# Patient Record
Sex: MALE | Race: WHITE | URBAN - METROPOLITAN AREA
[De-identification: names, ages, dates, MRNs, and addresses within clinical notes are randomized per-mention and may not be internally consistent; named-entity substitution may affect disease eponyms.]

---

## 2017-08-07 ENCOUNTER — HISTORICAL (OUTPATIENT)
Dept: LAB | Facility: HOSPITAL | Age: 67
End: 2017-08-07

## 2019-11-20 ENCOUNTER — HISTORICAL (OUTPATIENT)
Dept: ADMINISTRATIVE | Facility: HOSPITAL | Age: 69
End: 2019-11-20

## 2019-11-20 LAB
ALBUMIN SERPL-MCNC: 4.6 GM/DL (ref 3.4–5)
ALBUMIN/GLOB SERPL: 1.92 {RATIO} (ref 1.5–2.5)
ALP SERPL-CCNC: 93 UNIT/L (ref 38–126)
ALT SERPL-CCNC: 21 UNIT/L (ref 7–52)
APPEARANCE, UA: CLEAR
AST SERPL-CCNC: 23 UNIT/L (ref 15–37)
BACTERIA #/AREA URNS AUTO: NORMAL /HPF
BILIRUB SERPL-MCNC: 0.7 MG/DL (ref 0.2–1)
BILIRUB UR QL STRIP: NEGATIVE MG/DL
BILIRUBIN DIRECT+TOT PNL SERPL-MCNC: 0.2 MG/DL (ref 0–0.5)
BILIRUBIN DIRECT+TOT PNL SERPL-MCNC: 0.5 MG/DL
BUN SERPL-MCNC: 20 MG/DL (ref 7–18)
CALCIUM SERPL-MCNC: 9.3 MG/DL (ref 8.5–10)
CHLORIDE SERPL-SCNC: 105 MMOL/L (ref 98–107)
CHOLEST SERPL-MCNC: 163 MG/DL (ref 0–200)
CHOLEST/HDLC SERPL: 3.4 {RATIO}
CO2 SERPL-SCNC: 28 MMOL/L (ref 21–32)
COLOR UR: YELLOW
CREAT SERPL-MCNC: 1.18 MG/DL (ref 0.6–1.3)
GLOBULIN SER-MCNC: 2.4 GM/DL (ref 1.2–3)
GLUCOSE (UA): NEGATIVE MG/DL
GLUCOSE SERPL-MCNC: 120 MG/DL (ref 74–106)
HDLC SERPL-MCNC: 48 MG/DL (ref 35–60)
HGB UR QL STRIP: NEGATIVE UNIT/L
KETONES UR QL STRIP: NEGATIVE MG/DL
LDLC SERPL CALC-MCNC: 94 MG/DL (ref 0–129)
LEUKOCYTE ESTERASE UR QL STRIP: NEGATIVE UNIT/L
NITRITE UR QL STRIP.AUTO: NEGATIVE
PH UR STRIP: 5 [PH]
POTASSIUM SERPL-SCNC: 5 MMOL/L (ref 3.5–5.1)
PROT SERPL-MCNC: 7 GM/DL (ref 6.4–8.2)
PROT UR QL STRIP: NEGATIVE MG/DL
PSA SERPL-MCNC: 0.43 NG/ML (ref 0–4.5)
RBC #/AREA URNS HPF: NORMAL /HPF
SODIUM SERPL-SCNC: 139 MMOL/L (ref 136–145)
SP GR UR STRIP: 1.02
SQUAMOUS EPITHELIAL, UA: NORMAL /LPF
T3FREE SERPL-MCNC: 2.84 PG/ML (ref 1.45–3.48)
T4 FREE SERPL-MCNC: 0.79 NG/DL (ref 0.76–1.46)
TRIGL SERPL-MCNC: 156 MG/DL (ref 30–150)
TSH SERPL-ACNC: 0.89 MIU/ML (ref 0.35–4.94)
UROBILINOGEN UR STRIP-ACNC: 0.2 MG/DL
VLDLC SERPL CALC-MCNC: 31.2 MG/DL
WBC #/AREA URNS AUTO: NORMAL /[HPF]

## 2020-05-20 ENCOUNTER — HISTORICAL (OUTPATIENT)
Dept: ADMINISTRATIVE | Facility: HOSPITAL | Age: 70
End: 2020-05-20

## 2020-05-20 LAB
BUN SERPL-MCNC: 25 MG/DL (ref 7–18)
CALCIUM SERPL-MCNC: 9.2 MG/DL (ref 8.5–10)
CHLORIDE SERPL-SCNC: 109 MMOL/L (ref 98–107)
CO2 SERPL-SCNC: 27 MMOL/L (ref 21–32)
CREAT SERPL-MCNC: 1.19 MG/DL (ref 0.6–1.3)
CREAT/UREA NIT SERPL: 21
EST. AVERAGE GLUCOSE BLD GHB EST-MCNC: 120 MG/DL
GLUCOSE SERPL-MCNC: 108 MG/DL (ref 74–106)
HBA1C MFR BLD: 5.8 % (ref 4.4–6.4)
POTASSIUM SERPL-SCNC: 5 MMOL/L (ref 3.5–5.1)
SODIUM SERPL-SCNC: 142 MMOL/L (ref 136–145)

## 2021-12-01 PROBLEM — Z00.00 ENCOUNTER FOR PREVENTIVE HEALTH EXAMINATION: Status: ACTIVE | Noted: 2021-12-01

## 2021-12-02 ENCOUNTER — APPOINTMENT (OUTPATIENT)
Dept: NEUROSURGERY | Facility: CLINIC | Age: 71
End: 2021-12-02
Payer: MEDICARE

## 2021-12-02 DIAGNOSIS — I67.1 CEREBRAL ANEURYSM, NONRUPTURED: ICD-10-CM

## 2021-12-02 DIAGNOSIS — Q28.2 ARTERIOVENOUS MALFORMATION OF CEREBRAL VESSELS: ICD-10-CM

## 2021-12-02 PROCEDURE — 99443: CPT | Mod: 95

## 2021-12-10 PROBLEM — I67.1 CEREBROVASCULAR DURAL AV FISTULA: Status: ACTIVE | Noted: 2021-12-10

## 2021-12-10 PROBLEM — Q28.2 CEREBRAL AVM: Status: ACTIVE | Noted: 2021-12-10

## 2021-12-10 NOTE — REASON FOR VISIT
[New Patient Visit] : a new patient visit [Home] : at home, [unfilled] , at the time of the visit. [Medical Office: ([facility])___] : at the medical office located in  [Spouse] : spouse [Verbal consent obtained from patient] : the patient, [unfilled]

## 2021-12-10 NOTE — ASSESSMENT
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 The risks, benefits, alternatives, complications and personnel associated with the procedure were discussed with the patient and the family in great detail.  They request that we proceed. 1/4/2022

## 2021-12-10 NOTE — HISTORY OF PRESENT ILLNESS
[Home] : at home, [unfilled] , at the time of the visit. [Medical Office: ([facility])___] : at the medical office located in  [Verbal consent obtained from patient] : the patient, [unfilled] [de-identified] : Hany cole is a 71yr old male on the phone for a new patient visit. 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 Here to discuss treatment.

## 2022-01-03 ENCOUNTER — OUTPATIENT (OUTPATIENT)
Dept: OUTPATIENT SERVICES | Facility: HOSPITAL | Age: 72
LOS: 1 days | End: 2022-01-03
Payer: MEDICARE

## 2022-01-03 ENCOUNTER — OUTPATIENT (OUTPATIENT)
Dept: OUTPATIENT SERVICES | Facility: HOSPITAL | Age: 72
LOS: 1 days | End: 2022-01-03

## 2022-01-03 VITALS
HEIGHT: 74 IN | DIASTOLIC BLOOD PRESSURE: 77 MMHG | RESPIRATION RATE: 16 BRPM | SYSTOLIC BLOOD PRESSURE: 136 MMHG | OXYGEN SATURATION: 97 % | TEMPERATURE: 97 F | WEIGHT: 250 LBS | HEART RATE: 90 BPM

## 2022-01-03 DIAGNOSIS — E78.5 HYPERLIPIDEMIA, UNSPECIFIED: ICD-10-CM

## 2022-01-03 DIAGNOSIS — Z01.818 ENCOUNTER FOR OTHER PREPROCEDURAL EXAMINATION: ICD-10-CM

## 2022-01-03 DIAGNOSIS — I10 ESSENTIAL (PRIMARY) HYPERTENSION: ICD-10-CM

## 2022-01-03 DIAGNOSIS — I67.1 CEREBRAL ANEURYSM, NONRUPTURED: ICD-10-CM

## 2022-01-03 DIAGNOSIS — Z98.890 OTHER SPECIFIED POSTPROCEDURAL STATES: Chronic | ICD-10-CM

## 2022-01-03 DIAGNOSIS — Z87.19 PERSONAL HISTORY OF OTHER DISEASES OF THE DIGESTIVE SYSTEM: Chronic | ICD-10-CM

## 2022-01-03 DIAGNOSIS — Z11.52 ENCOUNTER FOR SCREENING FOR COVID-19: ICD-10-CM

## 2022-01-03 LAB
ALBUMIN SERPL ELPH-MCNC: 4.5 G/DL — SIGNIFICANT CHANGE UP (ref 3.3–5)
ALP SERPL-CCNC: 123 U/L — HIGH (ref 40–120)
ALT FLD-CCNC: 40 U/L — SIGNIFICANT CHANGE UP (ref 10–45)
ANION GAP SERPL CALC-SCNC: 14 MMOL/L — SIGNIFICANT CHANGE UP (ref 5–17)
AST SERPL-CCNC: 30 U/L — SIGNIFICANT CHANGE UP (ref 10–40)
BILIRUB SERPL-MCNC: 0.4 MG/DL — SIGNIFICANT CHANGE UP (ref 0.2–1.2)
BLD GP AB SCN SERPL QL: NEGATIVE — SIGNIFICANT CHANGE UP
BUN SERPL-MCNC: 26 MG/DL — HIGH (ref 7–23)
CALCIUM SERPL-MCNC: 9.7 MG/DL — SIGNIFICANT CHANGE UP (ref 8.4–10.5)
CHLORIDE SERPL-SCNC: 102 MMOL/L — SIGNIFICANT CHANGE UP (ref 96–108)
CO2 SERPL-SCNC: 23 MMOL/L — SIGNIFICANT CHANGE UP (ref 22–31)
CREAT SERPL-MCNC: 1.22 MG/DL — SIGNIFICANT CHANGE UP (ref 0.5–1.3)
GLUCOSE SERPL-MCNC: 130 MG/DL — HIGH (ref 70–99)
HCT VFR BLD CALC: 46.3 % — SIGNIFICANT CHANGE UP (ref 39–50)
HGB BLD-MCNC: 15.7 G/DL — SIGNIFICANT CHANGE UP (ref 13–17)
MCHC RBC-ENTMCNC: 31 PG — SIGNIFICANT CHANGE UP (ref 27–34)
MCHC RBC-ENTMCNC: 33.9 GM/DL — SIGNIFICANT CHANGE UP (ref 32–36)
MCV RBC AUTO: 91.3 FL — SIGNIFICANT CHANGE UP (ref 80–100)
NRBC # BLD: 0 /100 WBCS — SIGNIFICANT CHANGE UP (ref 0–0)
PLATELET # BLD AUTO: 208 K/UL — SIGNIFICANT CHANGE UP (ref 150–400)
POTASSIUM SERPL-MCNC: 4.3 MMOL/L — SIGNIFICANT CHANGE UP (ref 3.5–5.3)
POTASSIUM SERPL-SCNC: 4.3 MMOL/L — SIGNIFICANT CHANGE UP (ref 3.5–5.3)
PROT SERPL-MCNC: 7 G/DL — SIGNIFICANT CHANGE UP (ref 6–8.3)
RBC # BLD: 5.07 M/UL — SIGNIFICANT CHANGE UP (ref 4.2–5.8)
RBC # FLD: 12.8 % — SIGNIFICANT CHANGE UP (ref 10.3–14.5)
RH IG SCN BLD-IMP: POSITIVE — SIGNIFICANT CHANGE UP
SARS-COV-2 RNA SPEC QL NAA+PROBE: SIGNIFICANT CHANGE UP
SODIUM SERPL-SCNC: 139 MMOL/L — SIGNIFICANT CHANGE UP (ref 135–145)
WBC # BLD: 5.92 K/UL — SIGNIFICANT CHANGE UP (ref 3.8–10.5)
WBC # FLD AUTO: 5.92 K/UL — SIGNIFICANT CHANGE UP (ref 3.8–10.5)

## 2022-01-03 PROCEDURE — 93010 ELECTROCARDIOGRAM REPORT: CPT

## 2022-01-03 NOTE — H&P PST ADULT - NSICDXPASTMEDICALHX_GEN_ALL_CORE_FT
PAST MEDICAL HISTORY:  Cerebral AVM     H/O herpes simplex infection     HLD (hyperlipidemia)     HTN (hypertension)     Migraine headache     Seasonal allergies

## 2022-01-03 NOTE — H&P PST ADULT - FALL HARM RISK - UNIVERSAL INTERVENTIONS
Unknown if ever smoked
Bed in lowest position, wheels locked, appropriate side rails in place/Call bell, personal items and telephone in reach/Instruct patient to call for assistance before getting out of bed or chair/Non-slip footwear when patient is out of bed/[place] to call system/Physically safe environment - no spills, clutter or unnecessary equipment/Purposeful Proactive Rounding/Room/bathroom lighting operational, light cord in reach

## 2022-01-03 NOTE — H&P PST ADULT - ASSESSMENT
70 yo M with h/o HTN, HLD , cerebral AVM scheduled for cerebral angiogram and embolization on 1/4/22

## 2022-01-03 NOTE — H&P PST ADULT - PROBLEM SELECTOR PLAN 1
Cerebral angiogram and embolization  labs- CBC, BMP, T&S, EKG  Pre op instructions discussed  Cardiac clearance in file

## 2022-01-03 NOTE — H&P PST ADULT - NSICDXPASTSURGICALHX_GEN_ALL_CORE_FT
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

## 2022-01-03 NOTE — H&P PST ADULT - NEGATIVE NEUROLOGICAL SYMPTOMS
no transient paralysis/no weakness/no paresthesias/no generalized seizures/no syncope/no tremors/no vertigo/no loss of sensation/no difficulty walking

## 2022-01-03 NOTE — H&P PST ADULT - HISTORY OF PRESENT ILLNESS
72 yo M  with HTN, migraine HA- c/o left ear audible pulsation since 2015 c/o severe HA & double vision in 11//2021. Pt had Ed admission in [place]-  S/p MRI/MRA revealed  cerebral aneurysm. Pt had neurology consult- scheduled for cerebral angiogram& embolization on 1/4/2022  **pt denies any fever, chills, CP/SOB or sick contacts  **Covid 19 PCR swab done on 1/3/22 @ [facility] 72 yo M  with HTN, migraine HA- c/o left ear audible pulsation since 2015 c/o severe HA & double vision in 11/2021. Pt had Ed admission in [place]-  S/p MRI/MRA revealed  cerebral aneurysm. Pt had neurology consult- scheduled for cerebral angiogram& embolization on 1/4/2022  **pt denies any fever, chills, CP/SOB or sick contacts  **Covid 19 PCR swab done on 1/3/22 @ [facility]

## 2022-01-03 NOTE — H&P PST ADULT - NSICDXFAMILYHX_GEN_ALL_CORE_FT
FAMILY HISTORY:  Sibling  Still living? Yes, Estimated age: 71-80  FH: type 2 diabetes, Age at diagnosis: Age Unknown

## 2022-01-04 ENCOUNTER — INPATIENT (INPATIENT)
Facility: HOSPITAL | Age: 72
LOS: 3 days | Discharge: HOME | DRG: 27 | End: 2022-01-08
Attending: NEUROLOGICAL SURGERY | Admitting: NEUROLOGICAL SURGERY
Payer: MEDICARE

## 2022-01-04 ENCOUNTER — APPOINTMENT (OUTPATIENT)
Dept: NEUROSURGERY | Facility: HOSPITAL | Age: 72
End: 2022-01-04
Payer: MEDICARE

## 2022-01-04 VITALS
WEIGHT: 250 LBS | DIASTOLIC BLOOD PRESSURE: 77 MMHG | OXYGEN SATURATION: 96 % | HEART RATE: 91 BPM | TEMPERATURE: 98 F | SYSTOLIC BLOOD PRESSURE: 138 MMHG | HEIGHT: 74 IN | RESPIRATION RATE: 18 BRPM

## 2022-01-04 DIAGNOSIS — I67.1 CEREBRAL ANEURYSM, NONRUPTURED: ICD-10-CM

## 2022-01-04 DIAGNOSIS — Z98.890 OTHER SPECIFIED POSTPROCEDURAL STATES: Chronic | ICD-10-CM

## 2022-01-04 DIAGNOSIS — Z01.818 ENCOUNTER FOR OTHER PREPROCEDURAL EXAMINATION: ICD-10-CM

## 2022-01-04 DIAGNOSIS — Z87.19 PERSONAL HISTORY OF OTHER DISEASES OF THE DIGESTIVE SYSTEM: Chronic | ICD-10-CM

## 2022-01-04 LAB — APTT BLD: 40.6 SEC — HIGH (ref 27.5–35.5)

## 2022-01-04 PROCEDURE — 76377 3D RENDER W/INTRP POSTPROCES: CPT | Mod: 26

## 2022-01-04 PROCEDURE — 75894 X-RAYS TRANSCATH THERAPY: CPT | Mod: 26

## 2022-01-04 PROCEDURE — 36227 PLACE CATH XTRNL CAROTID: CPT

## 2022-01-04 PROCEDURE — 36224 PLACE CATH CAROTD ART: CPT | Mod: 50

## 2022-01-04 PROCEDURE — 36226 PLACE CATH VERTEBRAL ART: CPT | Mod: 50

## 2022-01-04 PROCEDURE — 75898 FOLLOW-UP ANGIOGRAPHY: CPT | Mod: 26

## 2022-01-04 PROCEDURE — 61624 TCAT PERM OCCLS/EMBOLJ CNS: CPT

## 2022-01-04 PROCEDURE — 75860 VEIN X-RAY NECK: CPT | Mod: 26,59

## 2022-01-04 PROCEDURE — 36012 PLACE CATHETER IN VEIN: CPT | Mod: LT

## 2022-01-04 RX ORDER — DEXAMETHASONE 0.5 MG/5ML
4 ELIXIR ORAL EVERY 6 HOURS
Refills: 0 | Status: DISCONTINUED | OUTPATIENT
Start: 2022-01-04 | End: 2022-01-07

## 2022-01-04 RX ORDER — NICARDIPINE HYDROCHLORIDE 30 MG/1
5 CAPSULE, EXTENDED RELEASE ORAL
Qty: 40 | Refills: 0 | Status: DISCONTINUED | OUTPATIENT
Start: 2022-01-04 | End: 2022-01-05

## 2022-01-04 RX ORDER — FENOFIBRATE,MICRONIZED 130 MG
145 CAPSULE ORAL DAILY
Refills: 0 | Status: DISCONTINUED | OUTPATIENT
Start: 2022-01-04 | End: 2022-01-08

## 2022-01-04 RX ORDER — TOBRAMYCIN 0.3 %
1 DROPS OPHTHALMIC (EYE) EVERY 4 HOURS
Refills: 0 | Status: COMPLETED | OUTPATIENT
Start: 2022-01-04 | End: 2022-01-05

## 2022-01-04 RX ORDER — HEPARIN SODIUM 5000 [USP'U]/ML
500 INJECTION INTRAVENOUS; SUBCUTANEOUS
Qty: 25000 | Refills: 0 | Status: DISCONTINUED | OUTPATIENT
Start: 2022-01-04 | End: 2022-01-06

## 2022-01-04 RX ORDER — HYDROCHLOROTHIAZIDE 25 MG
12.5 TABLET ORAL DAILY
Refills: 0 | Status: DISCONTINUED | OUTPATIENT
Start: 2022-01-04 | End: 2022-01-05

## 2022-01-04 RX ORDER — TOBRAMYCIN 0.3 %
1 DROPS OPHTHALMIC (EYE) EVERY 4 HOURS
Refills: 0 | Status: DISCONTINUED | OUTPATIENT
Start: 2022-01-04 | End: 2022-01-04

## 2022-01-04 RX ORDER — LEVETIRACETAM 250 MG/1
500 TABLET, FILM COATED ORAL EVERY 12 HOURS
Refills: 0 | Status: DISCONTINUED | OUTPATIENT
Start: 2022-01-04 | End: 2022-01-08

## 2022-01-04 RX ORDER — ACETAMINOPHEN 500 MG
1000 TABLET ORAL ONCE
Refills: 0 | Status: COMPLETED | OUTPATIENT
Start: 2022-01-04 | End: 2022-01-04

## 2022-01-04 RX ORDER — LISINOPRIL 2.5 MG/1
40 TABLET ORAL DAILY
Refills: 0 | Status: DISCONTINUED | OUTPATIENT
Start: 2022-01-04 | End: 2022-01-05

## 2022-01-04 RX ORDER — ONDANSETRON 8 MG/1
4 TABLET, FILM COATED ORAL EVERY 8 HOURS
Refills: 0 | Status: DISCONTINUED | OUTPATIENT
Start: 2022-01-04 | End: 2022-01-05

## 2022-01-04 RX ORDER — SODIUM CHLORIDE 9 MG/ML
1000 INJECTION INTRAMUSCULAR; INTRAVENOUS; SUBCUTANEOUS
Refills: 0 | Status: DISCONTINUED | OUTPATIENT
Start: 2022-01-04 | End: 2022-01-06

## 2022-01-04 RX ORDER — LORATADINE 10 MG/1
10 TABLET ORAL DAILY
Refills: 0 | Status: DISCONTINUED | OUTPATIENT
Start: 2022-01-04 | End: 2022-01-08

## 2022-01-04 RX ORDER — VALACYCLOVIR 500 MG/1
1 TABLET, FILM COATED ORAL
Qty: 0 | Refills: 0 | DISCHARGE

## 2022-01-04 RX ADMIN — SODIUM CHLORIDE 70 MILLILITER(S): 9 INJECTION INTRAMUSCULAR; INTRAVENOUS; SUBCUTANEOUS at 21:32

## 2022-01-04 RX ADMIN — Medication 1 DROP(S): at 21:31

## 2022-01-04 RX ADMIN — Medication 1 DROP(S): at 16:28

## 2022-01-04 RX ADMIN — Medication 1 DROP(S): at 17:24

## 2022-01-04 RX ADMIN — NICARDIPINE HYDROCHLORIDE 25 MG/HR: 30 CAPSULE, EXTENDED RELEASE ORAL at 18:47

## 2022-01-04 RX ADMIN — NICARDIPINE HYDROCHLORIDE 25 MG/HR: 30 CAPSULE, EXTENDED RELEASE ORAL at 21:05

## 2022-01-04 RX ADMIN — Medication 1000 MILLIGRAM(S): at 18:54

## 2022-01-04 RX ADMIN — Medication 400 MILLIGRAM(S): at 18:54

## 2022-01-04 RX ADMIN — LEVETIRACETAM 400 MILLIGRAM(S): 250 TABLET, FILM COATED ORAL at 17:47

## 2022-01-04 RX ADMIN — ONDANSETRON 4 MILLIGRAM(S): 8 TABLET, FILM COATED ORAL at 21:31

## 2022-01-04 RX ADMIN — Medication 4 MILLIGRAM(S): at 17:45

## 2022-01-04 RX ADMIN — SODIUM CHLORIDE 70 MILLILITER(S): 9 INJECTION INTRAMUSCULAR; INTRAVENOUS; SUBCUTANEOUS at 18:29

## 2022-01-04 RX ADMIN — HEPARIN SODIUM 5 UNIT(S)/HR: 5000 INJECTION INTRAVENOUS; SUBCUTANEOUS at 17:42

## 2022-01-04 NOTE — PROGRESS NOTE ADULT - SUBJECTIVE AND OBJECTIVE BOX
HPI:  72 yo M  with HTN, migraine HA- c/o left ear audible pulsation since 2015 c/o severe HA & double vision in 11/2021. Pt had Ed admission in [place]-  S/p MRI/MRA revealed  cerebral AVM.    Status post cerebral angiogram& embolization day #0    VITALS:  T(C): , Max: 36.7 (01-04-22 @ 10:19)  HR:  (90 - 100)  BP:  (118/78 - 138/77)  ABP:  (123/66 - 131/66)  RR:  (16 - 19)  SpO2:  (93% - 98%)  Wt(kg): --      LABS:  Na: 139 (01-03 @ 20:29)  K: 4.3 (01-03 @ 20:29)  Cl: 102 (01-03 @ 20:29)  CO2: 23 (01-03 @ 20:29)  BUN: 26 (01-03 @ 20:29)  Cr: 1.22 (01-03 @ 20:29)  Glu: 130(01-03 @ 20:29)    Hgb: 15.7 (01-03 @ 20:29)  Hct: 46.3 (01-03 @ 20:29)  WBC: 5.92 (01-03 @ 20:29)  Plt: 208 (01-03 @ 20:29)    INR:   PTT: 40.6 01-04-22 @ 16:17    IMAGING:   Recent imaging studies were reviewed.    MEDICATIONS:  dexAMETHasone  Injectable 4 milliGRAM(s) IV Push every 6 hours  fenofibrate Tablet 145 milliGRAM(s) Oral daily  heparin  Infusion 500 Unit(s)/Hr IV Continuous <Continuous>  hydrochlorothiazide 12.5 milliGRAM(s) Oral daily  levETIRAcetam  IVPB 500 milliGRAM(s) IV Intermittent every 12 hours  lisinopril 40 milliGRAM(s) Oral daily  loratadine 10 milliGRAM(s) Oral daily  niCARdipine Infusion 5 mG/Hr IV Continuous <Continuous>  sodium chloride 0.9%. 1000 milliLiter(s) IV Continuous <Continuous>  tobramycin 0.3% Ophthalmic Solution 1 Drop(s) Right EYE every 4 hours    EXAMINATION:  General:  calm  HEENT: right corneal abrasion   Neuro:  awake, alert, oriented x 3, normal EOM, Face symmetrical, follows commands, moves all extremities full power   Cards:  RRR  Respiratory:  no respiratory distress  Adomen:  soft  Extremities:  no edemaright LL splinted, groin is clear, intact pedal pulse

## 2022-01-04 NOTE — CHART NOTE - NSCHARTNOTEFT_GEN_A_CORE
Interventional Neuro- Radiology   Procedure Note      Procedure: Selective Cerebral Angiography and embolization   Pre- Procedure Diagnosis: dural AVF   Post- Procedure Diagnosis:    : Dr. Jerrell MD  Fellow: Dr. Moscoso   NP: Brandi Magana   Physician Assistant:     RN: Nan/ Brayan   Tech: Mani/ Oscar     Anesthesia: Dr. Price  (general anesthesia)    I/Os:  Fluids:  Gómez:  Contrast:  Estimated Blood Loss: <10cc    Preliminary Report:  Under general anesthesia, using a ___Fr short/long sheath to the right/ left/ bilateral groin examination of left vertebral artery/ left internal carotid artery/ left external carotid artery/ right vertebral artery/ right internal carotid artery/ right external carotid artery via selective cerebral angiography demonstrates ________. ( Official note to follow).    Patient tolerated procedure well, vital signs stable, hemodynamically stable, no change in neurological status compared to baseline. Results discussed with neurosurgery/ patient and their family. Groin sheath d/c'ed, manual compression held to hemostasis, no active bleeding, no hematoma, Vascade applied, quick clot and safeguard balloon dressing applied at _____h. Patient transferred to PACU/ NSCU for further care/ monitoring. Interventional Neuro- Radiology   Procedure Note      Procedure: Selective Cerebral Angiography and embolization   Pre- Procedure Diagnosis: dural AVF   Post- Procedure Diagnosis:    : Dr. Jerrell MD  Fellow: Dr. Moscoso   NP: Brandi Magana   Physician Assistant: Melody Grossman     RN: Nan/ Brayan   Tech: Mani/ Oscar     Anesthesia: Dr. Price  (general anesthesia)    I/Os:  Fluids:  Gómez:  Contrast:  Estimated Blood Loss: <10cc    Preliminary Report:  Under general anesthesia, using a ___Fr short/long sheath to the right/ left/ bilateral groin examination of left vertebral artery/ left internal carotid artery/ left external carotid artery/ right vertebral artery/ right internal carotid artery/ right external carotid artery via selective cerebral angiography demonstrates ________. ( Official note to follow).    Patient tolerated procedure well, vital signs stable, hemodynamically stable, no change in neurological status compared to baseline. Results discussed with neurosurgery/ patient and their family. Groin sheath d/c'ed, manual compression held to hemostasis, no active bleeding, no hematoma, Vascade applied, quick clot and safeguard balloon dressing applied at _____h. Patient transferred to PACU/ NSCU for further care/ monitoring. Interventional Neuro- Radiology   Procedure Note      Procedure: Selective Cerebral Angiography and embolization with [place] 34  Pre- Procedure Diagnosis: dural AVF   Post- Procedure Diagnosis: obliteration of dural AV fistula     : Dr Albert Allan   Fellow: Dr Adam Moscoso   NP: Brandi Magana             Physician Assistant: Melody Grossman     Nurse:                                      Gabrielle Neely RN  Radiologic Tech:                      Mani Ramirez LRT      Herbert Knott LRT  Anesthesia:                             Dr Elian Price         Right femoral artery:                4 Nigerian Fubuki 80 cm sheath   Right femoral vein:                   6 Nigerian BMX 90cm sheath     I/Os: EBL less than 10cc  Fluids:500cc  Gómez:    Contrast:    Preliminary Report:  Under general anesthesia, using a 4 Maori 80 cm Fubuki sheath to the right femoral artery and a 6 Nigerian BMX 90cm in the right femoral vein a selective cerebral angiography and embolization of fistula of was performed. Official note to follow.     Patient tolerated procedure well, vital signs stable, hemodynamically stable, no change in neurological status compared to baseline. Results discussed with neurosurgery ICU and patient. Right groin sheaths were discontinued and manual compression was held to hemostasis. Interventional Neuro- Radiology   Procedure Note      Procedure: Selective Cerebral Angiography and embolization with [place] 34  Pre- Procedure Diagnosis: dural AVF   Post- Procedure Diagnosis: obliteration of dural AV fistula     : Dr Albert Allan   Fellow: Dr Adam Moscoso   NP: Brandi Magana             Physician Assistant: Melody Grossman     Nurse:                                      Gabrielle Neely RN  Radiologic Tech:                      Mani Ramirez LRT      Herbert Knott LRT  Anesthesia:                              Dr Elian Price         Right femoral artery:                4 Samoan Fubuki 80 cm sheath   Right femoral vein:                   6 Samoan BMX 90cm sheath     I/Os: EBL less than 10cc  Fluids:500cc  Gómez:    Contrast:    Preliminary Report:  Under general anesthesia, using a 4 Wolof 80 cm Fubuki sheath to the right femoral artery and a 6 Samoan BMX 90cm in the right femoral vein a selective cerebral angiography and embolization of fistula of was performed. Official note to follow.     Patient tolerated procedure well, vital signs stable, hemodynamically stable, no change in neurological status compared to baseline. Results discussed with neurosurgery ICU and patient. Right groin sheaths were discontinued and manual compression was held to hemostasis. Interventional Neuro- Radiology   Procedure Note      Procedure: Selective Cerebral Angiography and embolization with [place] 34 and 1 pod   Pre- Procedure Diagnosis: dural AVF   Post- Procedure Diagnosis: obliteration of dural AV fistula     : Dr Albert Allan   Fellow: Dr Adam Moscoso   NP: Brandi Magana             Physician Assistant: Melody Grossman     Nurse:                                      Gabrielle Neely RN  Radiologic Tech:                      Mani Ramirez LRT      Herbert Knott LRT  Anesthesia:                              Dr Elian Price         Right femoral artery:                4 Uzbek Fubuki 80 cm sheath   Right femoral vein:                  6 Uzbek BMX 90cm sheath     I/Os: EBL less than 10cc  Fluids:500cc  Gómez:    Contrast:    Preliminary Report:  Under general anesthesia, using a 4 Citizen of [place] 80 cm Fubuki sheath to the right femoral artery and a 6 Uzbek BMX 90cm in the right femoral vein a selective cerebral angiography and embolization of fistula of was performed. Official note to follow.     Patient tolerated procedure well, vital signs stable, hemodynamically stable, no change in neurological status compared to baseline. Results discussed with neurosurgery ICU and patient. Right groin sheaths were discontinued and manual compression was held to hemostasis. Interventional Neuro- Radiology   Procedure Note      Procedure: Selective Cerebral Angiography and embolization of fistula with [place] 34 and 1 pod   Pre- Procedure Diagnosis: dural AVF   Post- Procedure Diagnosis: obliteration of dural AV fistula     : Dr Albert Allan   Fellow: Dr Adam Moscoso   NP: Brandi Magana             Physician Assistant: Melody Grossman     Nurse:                                      Gabrielle Neely RN  Radiologic Tech:                      Mani Ramirez LRT      Herbert Knott LRT  Anesthesia:                              Dr Elian Price         Right femoral artery:                4 Israeli Fubuki 80 cm sheath   Right femoral vein:                  6 Israeli BMX 90cm sheath     I/Os: EBL less than 10cc  Fluids:500cc  Gómez:    Contrast:    Preliminary Report:  Under general anesthesia, using a 4 Tajik 80 cm Fubuki sheath to the right femoral artery and a 6 Israeli BMX 90cm in the right femoral vein a selective cerebral angiography and embolization of fistula of was performed with [place] 34 and 1 pod. Official note to follow.     Patient tolerated procedure well, vital signs stable, hemodynamically stable, no change in neurological status compared to baseline. Results discussed with neurosurgery ICU and patient. Right groin sheaths were discontinued and manual compression was held to hemostasis at sites. Interventional Neuro- Radiology   Procedure Note      Procedure: Selective Cerebral Angiography and embolization of fistula with [place] 34 and 1 pod   Pre- Procedure Diagnosis: dural AVF   Post- Procedure Diagnosis: obliteration of dural AV fistula     : Dr Albert Allan   Fellow: Dr Adam Moscoso   NP: Brandi Magana             Physician Assistant: Melody Grossman PA-C    Nurse:                                      Gabrielle Neely RN  Radiologic Tech:                      Mani Ramirez LRT      Herbert Knott LRT  Anesthesia:                              Dr Elian Price         Right femoral artery:                4 Belizean Fubuki 80 cm sheath   Right femoral vein:                  6 Belizean BMX 90cm sheath     I/Os: EBL less than 10cc  Fluids:500cc  Gómez:    Contrast:    Preliminary Report:  Under general anesthesia, using a 4 Danish 80 cm Fubuki sheath to the right femoral artery and a 6 Belizean BMX 90cm in the right femoral vein a selective cerebral angiography and embolization of fistula of was performed with [place] 34 and 1 pod. Official note to follow.     Patient tolerated procedure well, vital signs stable, hemodynamically stable, no change in neurological status compared to baseline. Results discussed with neurosurgery ICU and patient. Right groin sheaths were discontinued and manual compression was held to hemostasis at sites. Interventional Neuro- Radiology   Procedure Note      Procedure: Selective Cerebral Angiography and embolization of fistula with [place] 34 and 1 pod   Pre- Procedure Diagnosis: dural AVF   Post- Procedure Diagnosis: obliteration of dural AV fistula     : Dr Albert Allan   Fellow: Dr Adam Moscoso   NP: Brandi Magana                   Physician Assistant: Melody Grossman PA-C    Nurse:                                      Gabrielle Neely RN  Radiologic Tech:                      Mani Ramirez LRT      Herbert Knott LRT  Anesthesia:                              Dr Elian Price         Right femoral artery:                4 Turkish Fubuki 80 cm sheath   Right femoral vein:                   6 Turkish BMX 90cm sheath     I/Os: EBL less than 10cc  Fluids:500cc  Gómez:    Contrast:    Preliminary Report:  Under general anesthesia, using a 4 Marshallese 80 cm Fubuki sheath to the right femoral artery and a 6 Turkish BMX 90cm in the right femoral vein a selective cerebral angiography and embolization of fistula of was performed with [place] 34 and 1 pod. Official note to follow.     Patient tolerated procedure well, vital signs stable, hemodynamically stable, no change in neurological status compared to baseline. Results discussed with neurosurgery ICU and patient. Right groin sheaths were discontinued and manual compression was held to hemostasis at sites. Interventional Neuro- Radiology   Procedure Note      Procedure: Selective Cerebral Angiography and embolization of left transverse sinus fistula with [place] 34 and 1 pod   Pre- Procedure Diagnosis:   left transverse sinus fistula    Post- Procedure Diagnosis: obliteration of left transverse sinus fistula     : Dr Albert Allan   Fellow: Dr Adam Moscoso   NP: Brandi Magana               Physician Assistant: Melody Grossman PA-C    Nurse:                                      Gabrielle Neely RN  Radiologic Tech:                      Mani Ramirez LRT      Herbert Knott LRT  Anesthesia:                              Dr Elian Price         Right femoral artery:                4 Panamanian Fubuki 80 cm sheath   Right femoral vein:                   6 Panamanian BMX 90cm sheath     I/Os: EBL less than 10cc  Fluids:500cc  Gómez:     Contrast:    [place] 34 16cc s     Preliminary Report:  Under general anesthesia, using a 4 Guyanese 80 cm Fubuki sheath to the right femoral artery and a 6 Panamanian BMX 90cm in the right femoral vein a selective cerebral angiography and embolization of fistula of was performed with [place] 34 and 1 pod. Official note to follow.     Patient tolerated procedure well, vital signs stable, hemodynamically stable, no change in neurological status compared to baseline. Results discussed with neurosurgery ICU and patient. Right groin sheaths were discontinued and manual compression was held to hemostasis at sites. Interventional Neuro- Radiology   Procedure Note      Procedure: Selective Cerebral Angiography and embolization of left transverse sinus fistula with [place] 34 and 1 pod   Pre- Procedure Diagnosis:   left transverse sinus fistula    Post- Procedure Diagnosis: obliteration of left transverse sinus fistula     : Dr Albert Allan   Fellow: Dr Adam Moscoso   NP: Brandi Magana               Physician Assistant: Melody Grossman PA-C    Nurse:                                      Gabrielle Neely RN  Radiologic Tech:                      Mani Ramirez LRT      Herbert Knott LRT  Anesthesia:                              Dr Elian Price         Right femoral artery:                4 Kittitian Fubuki 80 cm sheath   Right femoral vein:                   6 Kittitian BMX 90cm sheath     I/Os: EBL less than 10cc  Fluids:500cc  Gómez:     Contrast: 196cc    [place] 34 16cc s     Preliminary Report:  Under general anesthesia, using a 4 Libyan 80 cm Fubuki sheath to the right femoral artery and a 6 Kittitian BMX 90cm in the right femoral vein a selective cerebral angiography and embolization of fistula of was performed with [place] 34 and 1 pod. Official note to follow.     Patient tolerated procedure well, vital signs stable, hemodynamically stable, no change in neurological status compared to baseline. Results discussed with neurosurgery ICU and patient. Right groin sheaths were discontinued and manual compression was held to hemostasis at sites, by resident. Interventional Neuro- Radiology   Procedure Note      Procedure: Selective Cerebral Angiography and embolization of left transverse sinus fistula with [place] 34 and 1 pod   Pre- Procedure Diagnosis:   left transverse sinus fistula    Post- Procedure Diagnosis: obliteration of left transverse sinus fistula     : Dr Albert Allan   Fellow: Dr Adam Moscoso   NP: rBandi Magana               Physician Assistant: Melody Grossman PA-C    Nurse:                                      Gabrielle Neely RN  Radiologic Tech:                      Mnai Ramirez LRT      Herbert Knott LRT  Anesthesia:                              Dr Elian Price         Right femoral artery:                4 Romansh Fubuki 80 cm sheath   Right femoral vein:                   6 Romansh BMX 90cm sheath     I/Os: EBL less than 10cc  Fluids:500cc  Gómez: 1000    Contrast: 196cc    [place] 34 16cc s     Preliminary Report:  Under general anesthesia, using a 4 Mongolian 80 cm Fubuki sheath to the right femoral artery and a 6 Romansh BMX 90cm in the right femoral vein a selective cerebral angiography via left right internal carotid artery, right external carotid artery, right vertebral artery, left internal carotid artery, left external carotid artery left vertebral artery was done and 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  Proceeded with embolization of left transverse sinus dural av fistula with 16cc [place] 34 and 1 coil pod. Official note to follow.     Patient tolerated procedure well, vital signs stable, hemodynamically stable, no change in neurological status compared to baseline. Results discussed with neurosurgery ICU and patient. Right groin sheaths were discontinued and manual compression was held to hemostasis at sites, by resident. Interventional Neuro- Radiology   Procedure Note      Procedure: Selective Cerebral Angiography and embolization of left transverse sinus fistula with [place] 34 and 1 pod   Pre- Procedure Diagnosis:   left transverse sinus fistula    Post- Procedure Diagnosis: obliteration of left transverse sinus fistula     : Dr Albert Allan   Fellow: Dr Adam Moscoso   NP: Brandi Magana               Physician Assistant: Melody Grossman PA-C    Nurse:                                      Gabrielle Neely RN  Radiologic Tech:                      Mani Ramirez LRT      Herbert Knott LRT  Anesthesia:                              Dr Elian Price         Right femoral artery:                4 Thai Fubuki 80 cm sheath   Right femoral vein:                   6 Thai BMX 90cm sheath     I/Os: EBL less than 10cc  Fluids:500cc  Gómez: 1000    Contrast: 196cc    [place] 34 16cc s     Meds: ancef 2 grams; decadron 10mg IV; heparin 4000 units IV    Preliminary Report:  Under general anesthesia, using a 4 Burkinan 80 cm Fubuki sheath to the right femoral artery and a 6 Thai BMX 90cm in the right femoral vein a selective cerebral angiography via left right internal carotid artery, right external carotid artery, right vertebral artery, left internal carotid artery, left external carotid artery left vertebral artery was done and 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  Proceeded with embolization of left transverse sinus dural av fistula with 16cc [place] 34 and 1 coil pod. Official note to follow.   -110 post procedure  Heparin drip goal ptt 50-70 post procedure then after 24hrs will evaluate stability to transition to eliquis 5mg bid  Patient tolerated procedure well, vital signs stable, hemodynamically stable, no change in neurological status compared to baseline. Results discussed with neurosurgery ICU and patient. Right groin sheaths were discontinued, vascade device deployed, and manual compression was held to hemostasis at sites, by resident safeguard placed at 1515.

## 2022-01-04 NOTE — CHART NOTE - NSCHARTNOTESELECT_GEN_ALL_CORE
VTE Risk Assessment/Event Note
Interventional Neuro Radiology/Event Note
Interventional Neuro Radiology/Event Note

## 2022-01-04 NOTE — PROGRESS NOTE ADULT - ASSESSMENT
Summary:   72 yo M  with HTN, migraine HA- c/o left ear audible pulsation since 2015 c/o severe HA & double vision in 11/2021. Pt had Ed admission in [place]-  S/p MRI/MRA revealed  cerebral AVM.    Status post cerebral angiogram& embolization day #0    NEURO:    q1h neuro checks  Decadron 4mg Q6  Keppra 500 BID  Heparin gtt PTT goal 50-70    CARDS:    -140  Fenofibrate 145 mg   Lisinopril 40  Hydrochlorthiazide 12.5  Cardene gtt     PULM:    Nasal canula  sat > 92%    RENAL:    BMP QD  NS 70 ml/hr   Hydrochlorthiazide 12.5    GASTRO:    advance as tolerated  Monitor bowel motion     HEME:  monitor H/H    DVT prophylaxis: SCDs,   hold anticoagulation, fresh post op     ENDO:    140-180 mg/dl    ID:    Monitor for fever     Code status:  Full code  Disposition:  ICU    This patient was at high risk of neurologic deterioration and/or death due to: intracranial bleeding, brain edema

## 2022-01-04 NOTE — CHART NOTE - NSCHARTNOTEFT_GEN_A_CORE
Interventional Neuro Radiology  Pre-Procedure Note    This is a 72 yo M with HTN, migraine HA- c/o left ear audible pulsation since 2015 c/o severe HA & double vision in 11/2021. Pt had Ed admission in [place]-  S/p MRI/MRA revealed AVF. Patient presets now to neuro IR for selective cerebral angiography and possible embolization.     Neuro Exam: Awake and alert, oriented x3, fluent, normal naming and repetition, follows 3 step commands. Extraocular movements intact, no nystagmus, visual fields full, face symmetric, tongue midline. No drift, 5/5 power x 4 extremities. Normal sensation to LT. Normal finger-to-nose and rapid alternating movements.    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)  H/O cholecystitis h/o cholecystectomy      Social History:   Denies tobacco use    FAMILY HISTORY:  FH: type 2 diabetes (Sibling)      Allergies:   No Known Allergies      Current Medications:   · 	fenofibrate 145 mg oral tablet: Last Dose Taken:  , 1 tab(s) orally once a day  · 	hydroCHLOROthiazide 12.5 mg oral tablet: Last Dose Taken:  , 1 tab(s) orally once a day  · 	lisinopril 40 mg oral tablet: Last Dose Taken:  , 1 tab(s) orally once a day  · 	Multiple Vitamins oral tablet: 1 tab(s) orally once a day  · 	ZyrTEC 10 mg oral tablet: 1  orally , As Needed  · 	Allegra-D 12 Hour: Last Dose Taken:  , 1  orally , As Needed  · 	Claritin-D 12 Hour: Last Dose Taken:  , 1  orally , As Needed  · 	valACYclovir 1 g oral tablet: 1  orally , As Needed  · 	hydrocodone- acetamenophen: Last Dose Taken:  , 1  orally , As Needed        Labs:                         15.7   5.92  )-----------( 208      ( 03 Jan 2022 20:29 )             46.3       01-03    139  |  102  |  26<H>  ----------------------------<  130<H>  4.3   |  23  |  1.22    Ca    9.7      03 Jan 2022 20:29    TPro  7.0  /  Alb  4.5  /  TBili  0.4  /  DBili  x   /  AST  30  /  ALT  40  /  AlkPhos  123<H>  01-03      Blood Bank: 01-03-22  O  --  Positive    Assessment/Plan:   This is a 72yo male  presents with cerebral AVF. Patient presents to neuro-IR for selective cerebral angiography and embolization. Procedure/ risks/ benefits/ goals/ alternatives were explained. Risks include but are not limited to stroke/ vessel injury/ hemorrhage/ groin hematoma. All questions answered. Informed content obtained from patient. Consent placed in chart.     Leslie Keith PA-C  [phone]

## 2022-01-04 NOTE — ASU PATIENT PROFILE, ADULT - FALL HARM RISK - UNIVERSAL INTERVENTIONS
Bed in lowest position, wheels locked, appropriate side rails in place/Call bell, personal items and telephone in reach/Instruct patient to call for assistance before getting out of bed or chair/Non-slip footwear when patient is out of bed/[place] to call system/Physically safe environment - no spills, clutter or unnecessary equipment/Purposeful Proactive Rounding/Room/bathroom lighting operational, light cord in reach

## 2022-01-04 NOTE — CHART NOTE - NSCHARTNOTEFT_GEN_A_CORE
CAPRINI SCORE [CLOT] Score on Admission for     AGE RELATED RISK FACTORS                                                       MOBILITY RELATED FACTORS  [ ] Age 41-60 years                                            (1 Point)                  [ ] Bed rest                                                        (1 Point)  [x] Age: 61-74 years                                           (2 Points)                 [ ] Plaster cast                                                   (2 Points)  [ ] Age= 75 years                                              (3 Points)                 [ ] Bed bound for more than 72 hours                 (2 Points)    DISEASE RELATED RISK FACTORS                                               GENDER SPECIFIC FACTORS  [ ] Edema in the lower extremities                       (1 Point)                  [ ] Pregnancy                                                     (1 Point)  [ ] Varicose veins                                               (1 Point)                  [ ] Post-partum < 6 weeks                                   (1 Point)             [ ] BMI > 25 Kg/m2                                            (1 Point)                  [ ] Hormonal therapy  or oral contraception          (1 Point)                 [ ] Sepsis (in the previous month)                        (1 Point)                  [ ] History of pregnancy complications                 (1 point)  [ ] Pneumonia or serious lung disease                                               [ ] Unexplained or recurrent                     (1 Point)           (in the previous month)                               (1 Point)  [ ] Abnormal pulmonary function test                     (1 Point)                 SURGERY RELATED RISK FACTORS (include planned surgeries)  [ ] Acute myocardial infarction                              (1 Point)                 [ ]  Section                                             (1 Point)  [ ] Congestive heart failure (in the previous month)  (1 Point)         [ ] Minor surgery                                                  (1 Point)   [ ] Inflammatory bowel disease                             (1 Point)                 [ ] Arthroscopic surgery                                        (2 Points)  [ ] Central venous access                                      (2 Points)                [ ] General surgery lasting more than 45 minutes   (2 Points)       [ ] Stroke (in the previous month)                          (5 Points)               [ ] Elective arthroplasty                                         (5 Points)            [ ] current or past malignancy                              (2 Points)                                                                                                       HEMATOLOGY RELATED FACTORS                                                 TRAUMA RELATED RISK FACTORS  [ ] Prior episodes of VTE                                     (3 Points)                [ ] Fracture of the hip, pelvis, or leg                       (5 Points)  [ ] Positive family history for VTE                         (3 Points)                 [ ] Acute spinal cord injury (in the previous month)  (5 Points)  [ ] Prothrombin 29311 A                                     (3 Points)                 [ ] Paralysis  (less than 1 month)                             (5 Points)  [ ] Factor V Leiden                                             (3 Points)                  [ ] Multiple Trauma within 1 month                        (5 Points)  [ ] Lupus anticoagulants                                     (3 Points)                                                           [ ] Anticardiolipin antibodies                               (3 Points)                                                       [ ] High homocysteine in the blood                      (3 Points)                                             [ ] Other congenital or acquired thrombophilia      (3 Points)                                                [ ] Heparin induced thrombocytopenia                  (3 Points)                                          Total Score [     2    ]    Risk:  Very low 0   Low 1 to 2   Moderate 3 to 4   High =5       VTE Prophylasix Recommednations:  [x] mechanical pneumatic compression devices                                      [ ] contraindicated: _____________________  [ ] chemo prophylasix                                                                                   [] contraindicated _____________________    **** HIGH LIKELIHOOD DVT PRESENT ON ADMISSION  [ ] (please order LE dopplers within 24 hours of admission)

## 2022-01-05 LAB
ANION GAP SERPL CALC-SCNC: 16 MMOL/L — SIGNIFICANT CHANGE UP (ref 5–17)
APTT BLD: 29.1 SEC — SIGNIFICANT CHANGE UP (ref 27.5–35.5)
APTT BLD: 39 SEC — HIGH (ref 27.5–35.5)
APTT BLD: 39.2 SEC — HIGH (ref 27.5–35.5)
APTT BLD: 44.8 SEC — HIGH (ref 27.5–35.5)
BUN SERPL-MCNC: 24 MG/DL — HIGH (ref 7–23)
CALCIUM SERPL-MCNC: 8.1 MG/DL — LOW (ref 8.4–10.5)
CHLORIDE SERPL-SCNC: 106 MMOL/L — SIGNIFICANT CHANGE UP (ref 96–108)
CO2 SERPL-SCNC: 19 MMOL/L — LOW (ref 22–31)
CREAT SERPL-MCNC: 1.08 MG/DL — SIGNIFICANT CHANGE UP (ref 0.5–1.3)
GLUCOSE SERPL-MCNC: 187 MG/DL — HIGH (ref 70–99)
HCT VFR BLD CALC: 37.6 % — LOW (ref 39–50)
HCT VFR BLD CALC: 41.5 % — SIGNIFICANT CHANGE UP (ref 39–50)
HGB BLD-MCNC: 12.9 G/DL — LOW (ref 13–17)
HGB BLD-MCNC: 14 G/DL — SIGNIFICANT CHANGE UP (ref 13–17)
MAGNESIUM SERPL-MCNC: 1.8 MG/DL — SIGNIFICANT CHANGE UP (ref 1.6–2.6)
MCHC RBC-ENTMCNC: 30.4 PG — SIGNIFICANT CHANGE UP (ref 27–34)
MCHC RBC-ENTMCNC: 31 PG — SIGNIFICANT CHANGE UP (ref 27–34)
MCHC RBC-ENTMCNC: 33.7 GM/DL — SIGNIFICANT CHANGE UP (ref 32–36)
MCHC RBC-ENTMCNC: 34.3 GM/DL — SIGNIFICANT CHANGE UP (ref 32–36)
MCV RBC AUTO: 90 FL — SIGNIFICANT CHANGE UP (ref 80–100)
MCV RBC AUTO: 90.4 FL — SIGNIFICANT CHANGE UP (ref 80–100)
NRBC # BLD: 0 /100 WBCS — SIGNIFICANT CHANGE UP (ref 0–0)
NRBC # BLD: 0 /100 WBCS — SIGNIFICANT CHANGE UP (ref 0–0)
PHOSPHATE SERPL-MCNC: 2.2 MG/DL — LOW (ref 2.5–4.5)
PLATELET # BLD AUTO: 166 K/UL — SIGNIFICANT CHANGE UP (ref 150–400)
PLATELET # BLD AUTO: 190 K/UL — SIGNIFICANT CHANGE UP (ref 150–400)
POTASSIUM SERPL-MCNC: 3.8 MMOL/L — SIGNIFICANT CHANGE UP (ref 3.5–5.3)
POTASSIUM SERPL-SCNC: 3.8 MMOL/L — SIGNIFICANT CHANGE UP (ref 3.5–5.3)
RBC # BLD: 4.16 M/UL — LOW (ref 4.2–5.8)
RBC # BLD: 4.61 M/UL — SIGNIFICANT CHANGE UP (ref 4.2–5.8)
RBC # FLD: 13.2 % — SIGNIFICANT CHANGE UP (ref 10.3–14.5)
RBC # FLD: 13.3 % — SIGNIFICANT CHANGE UP (ref 10.3–14.5)
SODIUM SERPL-SCNC: 141 MMOL/L — SIGNIFICANT CHANGE UP (ref 135–145)
WBC # BLD: 10.63 K/UL — HIGH (ref 3.8–10.5)
WBC # BLD: 8.35 K/UL — SIGNIFICANT CHANGE UP (ref 3.8–10.5)
WBC # FLD AUTO: 10.63 K/UL — HIGH (ref 3.8–10.5)
WBC # FLD AUTO: 8.35 K/UL — SIGNIFICANT CHANGE UP (ref 3.8–10.5)

## 2022-01-05 PROCEDURE — 99233 SBSQ HOSP IP/OBS HIGH 50: CPT

## 2022-01-05 RX ORDER — CALCIUM GLUCONATE 100 MG/ML
1 VIAL (ML) INTRAVENOUS ONCE
Refills: 0 | Status: COMPLETED | OUTPATIENT
Start: 2022-01-05 | End: 2022-01-05

## 2022-01-05 RX ORDER — POTASSIUM PHOSPHATE, MONOBASIC POTASSIUM PHOSPHATE, DIBASIC 236; 224 MG/ML; MG/ML
15 INJECTION, SOLUTION INTRAVENOUS ONCE
Refills: 0 | Status: COMPLETED | OUTPATIENT
Start: 2022-01-05 | End: 2022-01-05

## 2022-01-05 RX ORDER — LISINOPRIL 2.5 MG/1
40 TABLET ORAL DAILY
Refills: 0 | Status: DISCONTINUED | OUTPATIENT
Start: 2022-01-05 | End: 2022-01-08

## 2022-01-05 RX ORDER — HYDROCHLOROTHIAZIDE 25 MG
12.5 TABLET ORAL DAILY
Refills: 0 | Status: DISCONTINUED | OUTPATIENT
Start: 2022-01-05 | End: 2022-01-08

## 2022-01-05 RX ORDER — POTASSIUM CHLORIDE 20 MEQ
40 PACKET (EA) ORAL ONCE
Refills: 0 | Status: COMPLETED | OUTPATIENT
Start: 2022-01-05 | End: 2022-01-05

## 2022-01-05 RX ADMIN — Medication 1 DROP(S): at 02:30

## 2022-01-05 RX ADMIN — Medication 4 MILLIGRAM(S): at 05:18

## 2022-01-05 RX ADMIN — HEPARIN SODIUM 14 UNIT(S)/HR: 5000 INJECTION INTRAVENOUS; SUBCUTANEOUS at 23:19

## 2022-01-05 RX ADMIN — LISINOPRIL 40 MILLIGRAM(S): 2.5 TABLET ORAL at 05:29

## 2022-01-05 RX ADMIN — Medication 12.5 MILLIGRAM(S): at 14:22

## 2022-01-05 RX ADMIN — HEPARIN SODIUM 13 UNIT(S)/HR: 5000 INJECTION INTRAVENOUS; SUBCUTANEOUS at 14:55

## 2022-01-05 RX ADMIN — Medication 4 MILLIGRAM(S): at 13:44

## 2022-01-05 RX ADMIN — Medication 4 MILLIGRAM(S): at 00:00

## 2022-01-05 RX ADMIN — SODIUM CHLORIDE 70 MILLILITER(S): 9 INJECTION INTRAMUSCULAR; INTRAVENOUS; SUBCUTANEOUS at 07:20

## 2022-01-05 RX ADMIN — HEPARIN SODIUM 11 UNIT(S)/HR: 5000 INJECTION INTRAVENOUS; SUBCUTANEOUS at 07:01

## 2022-01-05 RX ADMIN — Medication 100 GRAM(S): at 02:37

## 2022-01-05 RX ADMIN — Medication 4 MILLIGRAM(S): at 23:19

## 2022-01-05 RX ADMIN — LORATADINE 10 MILLIGRAM(S): 10 TABLET ORAL at 13:42

## 2022-01-05 RX ADMIN — LEVETIRACETAM 400 MILLIGRAM(S): 250 TABLET, FILM COATED ORAL at 17:34

## 2022-01-05 RX ADMIN — Medication 40 MILLIEQUIVALENT(S): at 02:37

## 2022-01-05 RX ADMIN — Medication 4 MILLIGRAM(S): at 20:00

## 2022-01-05 RX ADMIN — POTASSIUM PHOSPHATE, MONOBASIC POTASSIUM PHOSPHATE, DIBASIC 62.5 MILLIMOLE(S): 236; 224 INJECTION, SOLUTION INTRAVENOUS at 03:36

## 2022-01-05 RX ADMIN — LEVETIRACETAM 400 MILLIGRAM(S): 250 TABLET, FILM COATED ORAL at 05:18

## 2022-01-05 RX ADMIN — Medication 145 MILLIGRAM(S): at 13:44

## 2022-01-05 RX ADMIN — HEPARIN SODIUM 9 UNIT(S)/HR: 5000 INJECTION INTRAVENOUS; SUBCUTANEOUS at 00:16

## 2022-01-05 RX ADMIN — Medication 1 DROP(S): at 09:55

## 2022-01-05 RX ADMIN — Medication 1 DROP(S): at 05:17

## 2022-01-05 NOTE — PROGRESS NOTE ADULT - TIME BILLING
left transverse sinus fistula   post obliteration of left transverse sinus fistula   heparin drip and decadron per NS  patient refused MRI brain   PT/OT

## 2022-01-05 NOTE — PROGRESS NOTE ADULT - SUBJECTIVE AND OBJECTIVE BOX
HPI:  72 yo M  with HTN, migraine HA- c/o left ear audible pulsation since 2015 c/o severe HA & double vision in 11/2021. Pt had Ed admission in [place]-  S/p MRI/MRA revealed  cerebral AVM.    Status post cerebral angiogram& embolization day #1    OVERNIGHT EVENTS:   No acute events overnight.    VITALS:  T(C): , Max: 37.1 (01-05-22 @ 04:00)  HR:  (91 - 109)  BP:  (97/53 - 138/77)  ABP:  (88/56 - 131/66)  RR:  (14 - 19)  SpO2:  (91% - 99%)  Wt(kg): --      01-04-22 @ 07:01  -  01-05-22 @ 07:00  --------------------------------------------------------  IN: 2001 mL / OUT: 1525 mL / NET: 476 mL    01-05-22 @ 07:01  -  01-05-22 @ 07:44  --------------------------------------------------------  IN: 201 mL / OUT: 40 mL / NET: 161 mL      LABS:  Na: 141 (01-04 @ 23:43), 139 (01-03 @ 20:29)  K: 3.8 (01-04 @ 23:43), 4.3 (01-03 @ 20:29)  Cl: 106 (01-04 @ 23:43), 102 (01-03 @ 20:29)  CO2: 19 (01-04 @ 23:43), 23 (01-03 @ 20:29)  BUN: 24 (01-04 @ 23:43), 26 (01-03 @ 20:29)  Cr: 1.08 (01-04 @ 23:43), 1.22 (01-03 @ 20:29)  Glu: 187(01-04 @ 23:43), 130(01-03 @ 20:29)    Hgb: 12.9 (01-05 @ 06:15), 14.0 (01-04 @ 23:43), 15.7 (01-03 @ 20:29)  Hct: 37.6 (01-05 @ 06:15), 41.5 (01-04 @ 23:43), 46.3 (01-03 @ 20:29)  WBC: 10.63 (01-05 @ 06:15), 8.35 (01-04 @ 23:43), 5.92 (01-03 @ 20:29)  Plt: 166 (01-05 @ 06:15), 190 (01-04 @ 23:43), 208 (01-03 @ 20:29)    INR:   PTT: 39.2 01-05-22 @ 06:15, 29.1 01-04-22 @ 23:43, 40.6 01-04-22 @ 16:17    IMAGING:   Recent imaging studies were reviewed.    MEDICATIONS:  dexAMETHasone  Injectable 4 milliGRAM(s) IV Push every 6 hours  fenofibrate Tablet 145 milliGRAM(s) Oral daily  heparin  Infusion 500 Unit(s)/Hr IV Continuous <Continuous>  hydrochlorothiazide 12.5 milliGRAM(s) Oral daily  levETIRAcetam  IVPB 500 milliGRAM(s) IV Intermittent every 12 hours  lisinopril 40 milliGRAM(s) Oral daily  loratadine 10 milliGRAM(s) Oral daily  niCARdipine Infusion 5 mG/Hr IV Continuous <Continuous>  ondansetron Injectable 4 milliGRAM(s) IV Push every 8 hours PRN  sodium chloride 0.9%. 1000 milliLiter(s) IV Continuous <Continuous>  tobramycin 0.3% Ophthalmic Solution 1 Drop(s) Right EYE every 4 hours    EXAMINATION:  General:  calm  HEENT: right corneal abrasion   Neuro:  awake, alert, oriented x 3, normal EOM, Face symmetrical, follows commands, moves all extremities full power   Cards:  RRR  Respiratory:  no respiratory distress  Adomen:  soft  Extremities:  no edemaright LL splinted, groin is clear, intact pedal pulse HPI:  70 yo M  with HTN, migraine HA- c/o left ear audible pulsation since 2015 c/o severe HA & double vision in 11/2021. Pt had Ed admission in [place]-  S/p MRI/MRA revealed  cerebral AVM.    Status post cerebral angiogram& embolization day #1    PAST MEDICAL & SURGICAL HISTORY:  Cerebral AVM    HTN (hypertension)    HLD (hyperlipidemia)    Migraine headache    H/O herpes simplex infection    Seasonal allergies    S/P foot surgery, left  amputation left ist, 4th &amp; 5th toes- s/p injury ( 1972)    H/O cholecystitis  h/o cholecystectomy        Allergies    No Known Allergies    Intolerances            REVIEW OF SYSTEMS: [ ] Unable to Assess due to neurologic exam   [ x] All ROS addressed below are non-contributory, except:  Neuro: [ ] Headache [ ] Back pain [ ] Numbness [ ] Weakness [ ] Ataxia [ ] Dizziness [ ] Aphasia [ ] Dysarthria [ ] Visual disturbance  Resp: [ ] Shortness of breath/dyspnea, [ ] Orthopnea [ ] Cough  CV: [ ] Chest pain [ ] Palpitation [ ] Lightheadedness [ ] Syncope  Renal: [ ] Thirst [ ] Edema  GI: [ ] Nausea [ ] Emesis [ ] Abdominal pain [ ] Constipation [ ] Diarrhea  Hem: [ ] Hematemesis [ ] bright red blood per rectum  ID: [ ] Fever [ ] Chills [ ] Dysuria  ENT: [ ] Rhinorrhea        OVERNIGHT EVENTS:   on heparin drip overnight     VITALS:  T(C): , Max: 37.1 (01-05-22 @ 04:00)  HR:  (91 - 109)  BP:  (97/53 - 138/77)  ABP:  (88/56 - 131/66)  RR:  (14 - 19)  SpO2:  (91% - 99%)  Wt(kg): --      01-04-22 @ 07:01  -  01-05-22 @ 07:00  --------------------------------------------------------  IN: 2001 mL / OUT: 1525 mL / NET: 476 mL    01-05-22 @ 07:01  -  01-05-22 @ 07:44  --------------------------------------------------------  IN: 201 mL / OUT: 40 mL / NET: 161 mL      LABS:  Na: 141 (01-04 @ 23:43), 139 (01-03 @ 20:29)  K: 3.8 (01-04 @ 23:43), 4.3 (01-03 @ 20:29)  Cl: 106 (01-04 @ 23:43), 102 (01-03 @ 20:29)  CO2: 19 (01-04 @ 23:43), 23 (01-03 @ 20:29)  BUN: 24 (01-04 @ 23:43), 26 (01-03 @ 20:29)  Cr: 1.08 (01-04 @ 23:43), 1.22 (01-03 @ 20:29)  Glu: 187(01-04 @ 23:43), 130(01-03 @ 20:29)    Hgb: 12.9 (01-05 @ 06:15), 14.0 (01-04 @ 23:43), 15.7 (01-03 @ 20:29)  Hct: 37.6 (01-05 @ 06:15), 41.5 (01-04 @ 23:43), 46.3 (01-03 @ 20:29)  WBC: 10.63 (01-05 @ 06:15), 8.35 (01-04 @ 23:43), 5.92 (01-03 @ 20:29)  Plt: 166 (01-05 @ 06:15), 190 (01-04 @ 23:43), 208 (01-03 @ 20:29)    INR:   PTT: 39.2 01-05-22 @ 06:15, 29.1 01-04-22 @ 23:43, 40.6 01-04-22 @ 16:17    IMAGING:   Recent imaging studies were reviewed.    MEDICATIONS:  dexAMETHasone  Injectable 4 milliGRAM(s) IV Push every 6 hours  fenofibrate Tablet 145 milliGRAM(s) Oral daily  heparin  Infusion 500 Unit(s)/Hr IV Continuous <Continuous>  hydrochlorothiazide 12.5 milliGRAM(s) Oral daily  levETIRAcetam  IVPB 500 milliGRAM(s) IV Intermittent every 12 hours  lisinopril 40 milliGRAM(s) Oral daily  loratadine 10 milliGRAM(s) Oral daily  niCARdipine Infusion 5 mG/Hr IV Continuous <Continuous>  ondansetron Injectable 4 milliGRAM(s) IV Push every 8 hours PRN  sodium chloride 0.9%. 1000 milliLiter(s) IV Continuous <Continuous>  tobramycin 0.3% Ophthalmic Solution 1 Drop(s) Right EYE every 4 hours    EXAMINATION:  General:  calm  HEENT: right corneal abrasion   Neuro:  awake, alert, oriented x 3, normal EOM, Face symmetrical, follows commands, moves all extremities full power   Cards:  RRR  Respiratory:  no respiratory distress  Adomen:  soft  Extremities:  no edema  no groin hematoma,  intact pedal pulse

## 2022-01-05 NOTE — PROGRESS NOTE ADULT - ASSESSMENT
Summary:   72 yo M  with HTN, migraine HA- c/o left ear audible pulsation since 2015 c/o severe HA & double vision in 11/2021. Pt had Ed admission in [place]-  S/p MRI/MRA revealed  cerebral AVM.    Status post cerebral angiogram& embolization day #1    NEURO:    q1h neuro checks  Decadron 4mg Q6  Keppra 500 BID  Heparin gtt PTT goal 50-70    CARDS:    -140  Fenofibrate 145 mg   Lisinopril 40  Hydrochlorthiazide 12.5  Cardene gtt     PULM:    Nasal canula  sat > 92%    RENAL:    BMP QD  NS 70 ml/hr   Hydrochlorthiazide 12.5    GASTRO:    advance as tolerated  Monitor bowel motion     HEME:  monitor H/H    DVT prophylaxis: SCDs,   hold anticoagulation, fresh post op     ENDO:    140-180 mg/dl    ID:    Monitor for fever     Code status:  Full code  Disposition:  ICU    This patient was at high risk of neurologic deterioration and/or death due to: intracranial bleeding, brain edema      Summary:   70 yo M  with HTN, migraine HA- c/o left ear audible pulsation since 2015 c/o severe HA & double vision in 11/2021. Pt had Ed admission in [place]-  S/p MRI/MRA revealed  left transverse sinus fistula    Status post cerebral angiogram& embolization day #1    NEURO:    q1h neuro checks   Decadron 4mg Q6 per NS , might d/c today   MRI refused   Keppra 500 BID, would d/c   Heparin gtt PTT goal 50-70 per NS     CARDS:    -140 mmhg   Fenofibrate 145 mg   HTN on home Lisinopril 40  Hydrochlorthiazide 12.5  no groin hematoma     PULM:    RA     RENAL:    BMP QD  IVL     GASTRO:    advance as tolerated      HEME:  monitor H/H    DVT prophylaxis: SCDs,   on heparin drip     ENDO:    140-180 mg/dl  ISS , finger sticks while on decadron     ID:    Monitor for fever     Code status:  Full code  Disposition:  ICU

## 2022-01-06 LAB
APTT BLD: 40.5 SEC — HIGH (ref 27.5–35.5)
APTT BLD: 43.9 SEC — HIGH (ref 27.5–35.5)
APTT BLD: 45.8 SEC — HIGH (ref 27.5–35.5)
HCT VFR BLD CALC: 37.9 % — LOW (ref 39–50)
HGB BLD-MCNC: 12.8 G/DL — LOW (ref 13–17)
MCHC RBC-ENTMCNC: 31.1 PG — SIGNIFICANT CHANGE UP (ref 27–34)
MCHC RBC-ENTMCNC: 33.8 GM/DL — SIGNIFICANT CHANGE UP (ref 32–36)
MCV RBC AUTO: 92 FL — SIGNIFICANT CHANGE UP (ref 80–100)
NRBC # BLD: 0 /100 WBCS — SIGNIFICANT CHANGE UP (ref 0–0)
PLATELET # BLD AUTO: 160 K/UL — SIGNIFICANT CHANGE UP (ref 150–400)
RBC # BLD: 4.12 M/UL — LOW (ref 4.2–5.8)
RBC # FLD: 13.4 % — SIGNIFICANT CHANGE UP (ref 10.3–14.5)
WBC # BLD: 12.56 K/UL — HIGH (ref 3.8–10.5)
WBC # FLD AUTO: 12.56 K/UL — HIGH (ref 3.8–10.5)

## 2022-01-06 PROCEDURE — 99232 SBSQ HOSP IP/OBS MODERATE 35: CPT

## 2022-01-06 RX ORDER — HEPARIN SODIUM 5000 [USP'U]/ML
1500 INJECTION INTRAVENOUS; SUBCUTANEOUS
Qty: 25000 | Refills: 0 | Status: DISCONTINUED | OUTPATIENT
Start: 2022-01-06 | End: 2022-01-06

## 2022-01-06 RX ORDER — SODIUM CHLORIDE 9 MG/ML
1000 INJECTION INTRAMUSCULAR; INTRAVENOUS; SUBCUTANEOUS
Refills: 0 | Status: DISCONTINUED | OUTPATIENT
Start: 2022-01-06 | End: 2022-01-08

## 2022-01-06 RX ORDER — HEPARIN SODIUM 5000 [USP'U]/ML
1600 INJECTION INTRAVENOUS; SUBCUTANEOUS
Qty: 25000 | Refills: 0 | Status: DISCONTINUED | OUTPATIENT
Start: 2022-01-06 | End: 2022-01-07

## 2022-01-06 RX ADMIN — Medication 4 MILLIGRAM(S): at 05:18

## 2022-01-06 RX ADMIN — LEVETIRACETAM 400 MILLIGRAM(S): 250 TABLET, FILM COATED ORAL at 19:07

## 2022-01-06 RX ADMIN — Medication 145 MILLIGRAM(S): at 14:11

## 2022-01-06 RX ADMIN — HEPARIN SODIUM 15 UNIT(S)/HR: 5000 INJECTION INTRAVENOUS; SUBCUTANEOUS at 09:10

## 2022-01-06 RX ADMIN — LORATADINE 10 MILLIGRAM(S): 10 TABLET ORAL at 14:11

## 2022-01-06 RX ADMIN — Medication 12.5 MILLIGRAM(S): at 05:18

## 2022-01-06 RX ADMIN — LEVETIRACETAM 400 MILLIGRAM(S): 250 TABLET, FILM COATED ORAL at 05:19

## 2022-01-06 RX ADMIN — Medication 4 MILLIGRAM(S): at 14:11

## 2022-01-06 RX ADMIN — Medication 4 MILLIGRAM(S): at 19:07

## 2022-01-06 RX ADMIN — LISINOPRIL 40 MILLIGRAM(S): 2.5 TABLET ORAL at 05:23

## 2022-01-06 NOTE — PHYSICAL THERAPY INITIAL EVALUATION ADULT - PERTINENT HX OF CURRENT PROBLEM, REHAB EVAL
Patient is a 71 year old male with left ear audible pulsation for approximately five years.  Found to have vascular abnormality.  Now presented for treatment.

## 2022-01-06 NOTE — PROGRESS NOTE ADULT - SUBJECTIVE AND OBJECTIVE BOX
HPI:  Patient is a 71 year old male with left ear audible pulsation for approximately five years.  Found to have vascular abnormality.  Now presented for treatment.    OVERNIGHT EVENTS: No acute events overnight.  Awaiting mri with anesthesia.  Feels well, no current complaints.  Tolerating diet.    Vital Signs Last 24 Hrs  T(C): 36.3 (06 Jan 2022 11:45), Max: 36.8 (05 Jan 2022 18:46)  T(F): 97.4 (06 Jan 2022 11:45), Max: 98.2 (05 Jan 2022 18:46)  HR: 86 (06 Jan 2022 11:45) (75 - 101)  BP: 136/83 (06 Jan 2022 11:45) (102/57 - 136/83)  BP(mean): 76 (05 Jan 2022 18:00) (75 - 87)  RR: 18 (06 Jan 2022 11:45) (16 - 18)  SpO2: 95% (06 Jan 2022 11:45) (95% - 100%)    I&O's Detail    05 Jan 2022 07:01  -  06 Jan 2022 07:00  --------------------------------------------------------  IN:    Heparin: 116 mL    Oral Fluid: 1090 mL    sodium chloride 0.9%: 560 mL  Total IN: 1766 mL    OUT:    Indwelling Catheter - Urethral (mL): 3195 mL    NiCARdipine: 0 mL  Total OUT: 3195 mL    Total NET: -1429 mL      06 Jan 2022 07:01  -  06 Jan 2022 12:02  --------------------------------------------------------  IN:  Total IN: 0 mL    OUT:    Indwelling Catheter - Urethral (mL): 1000 mL  Total OUT: 1000 mL    Total NET: -1000 mL        I&O's Summary    05 Jan 2022 07:01  -  06 Jan 2022 07:00  --------------------------------------------------------  IN: 1766 mL / OUT: 3195 mL / NET: -1429 mL    06 Jan 2022 07:01  -  06 Jan 2022 12:02  --------------------------------------------------------  IN: 0 mL / OUT: 1000 mL / NET: -1000 mL        PHYSICAL EXAM:  Neurological: awake, alert, oriented x3, follows commands, speech clear and fluent, perrl, eomi, face symmetric, tongue midline, no drift b/l, moves all extremities x4 w/ 5/5 strength throughout, sensation present, intact, equal throughout    Cardiovascular: +s1, s2  Respiratory: clear to auscultation b/l  Gastrointestinal: soft, non-distended, non-tender  Genitourinary: +felder  Extremities: +dp/pt pulses palpable b/l  Incision/Wound: right groin puncture site c/d/i, no hematoma, no erythema    TUBES/LINES:  [x] none    DIET:  [x] regular      LABS:                        12.8   12.56 )-----------( 160      ( 06 Jan 2022 06:10 )             37.9     01-04    141  |  106  |  24<H>  ----------------------------<  187<H>  3.8   |  19<L>  |  1.08    Ca    8.1<L>      04 Jan 2022 23:43  Phos  2.2     01-04  Mg     1.8     01-04      PTT - ( 06 Jan 2022 06:08 )  PTT:43.9 sec          Allergies    No Known Allergies          MEDICATIONS:  Antibiotics:  none    Neuro:  levETIRAcetam  IVPB 500 milliGRAM(s) IV Intermittent every 12 hours    Anticoagulation:  heparin  Infusion 1500 Unit(s)/Hr IV Continuous <Continuous>    OTHER:  dexAMETHasone  Injectable 4 milliGRAM(s) IV Push every 6 hours  fenofibrate Tablet 145 milliGRAM(s) Oral daily  hydrochlorothiazide 12.5 milliGRAM(s) Oral daily  lisinopril 40 milliGRAM(s) Oral daily  loratadine 10 milliGRAM(s) Oral daily    IVF:  none    CULTURES:  none    RADIOLOGY & ADDITIONAL TESTS:  no new imaging

## 2022-01-06 NOTE — PHYSICAL THERAPY INITIAL EVALUATION ADULT - MD ORDER
Patient is a 71 year old male with left ear audible pulsation for approximately five years.  Found to have vascular abnormality.  Now presented for treatment.    cerebral angiogram and embolization of left transverse sinus fistula on 1/5/2022 cerebral angiogram and embolization of left transverse sinus fistula on 1/5/2022

## 2022-01-06 NOTE — PROGRESS NOTE ADULT - ASSESSMENT
HPI:  Patient is a 71 year old male with left ear audible pulsation for approximately five years.  Found to have vascular abnormality.  Now presented for treatment.    PROCEDURE: s/p cerebral angiogram and embolization of left transverse sinus fistula on 1/5/2022  PAD#1    PLAN:  Neuro:   -q4 hour neurovascular checks  -decadron 4q6  -mri brain with anesthesia - scheduled for tomorrow am  -keppra for seizure prophylaxis  -out of bed with assistance  -pt eval pending    Respiratory:   -satting well on room air  -continue claritin  -incentive spirometer for lung expansion    CV:  -kep sbp 100-140  -lisinopril and hctz for bp control  -fenofibrate for triglyceride control    Endocrine:   -maintain euglycemia    Heme/Onc:    -heparin gtt @ 15, goal aptt 50-70  -scds for dvt prophylaxis    Renal:   -[place] for strict ins and outs, dc after mri    ID:   -afebrile    GI:   -regular diet      Spectra #[id number]

## 2022-01-07 LAB
APTT BLD: 39.7 SEC — HIGH (ref 27.5–35.5)
SARS-COV-2 RNA SPEC QL NAA+PROBE: SIGNIFICANT CHANGE UP

## 2022-01-07 PROCEDURE — 70553 MRI BRAIN STEM W/O & W/DYE: CPT | Mod: 26

## 2022-01-07 PROCEDURE — 99233 SBSQ HOSP IP/OBS HIGH 50: CPT

## 2022-01-07 RX ORDER — DEXAMETHASONE 0.5 MG/5ML
4 ELIXIR ORAL EVERY 8 HOURS
Refills: 0 | Status: COMPLETED | OUTPATIENT
Start: 2022-01-07 | End: 2022-01-08

## 2022-01-07 RX ORDER — DEXAMETHASONE 0.5 MG/5ML
2 ELIXIR ORAL EVERY 8 HOURS
Refills: 0 | Status: DISCONTINUED | OUTPATIENT
Start: 2022-01-08 | End: 2022-01-08

## 2022-01-07 RX ORDER — DEXAMETHASONE 0.5 MG/5ML
2 ELIXIR ORAL EVERY 12 HOURS
Refills: 0 | Status: DISCONTINUED | OUTPATIENT
Start: 2022-01-09 | End: 2022-01-08

## 2022-01-07 RX ORDER — DEXAMETHASONE 0.5 MG/5ML
ELIXIR ORAL
Refills: 0 | Status: DISCONTINUED | OUTPATIENT
Start: 2022-01-07 | End: 2022-01-08

## 2022-01-07 RX ORDER — APIXABAN 2.5 MG/1
5 TABLET, FILM COATED ORAL
Refills: 0 | Status: DISCONTINUED | OUTPATIENT
Start: 2022-01-07 | End: 2022-01-08

## 2022-01-07 RX ORDER — HEPARIN SODIUM 5000 [USP'U]/ML
1800 INJECTION INTRAVENOUS; SUBCUTANEOUS
Qty: 25000 | Refills: 0 | Status: DISCONTINUED | OUTPATIENT
Start: 2022-01-07 | End: 2022-01-07

## 2022-01-07 RX ADMIN — Medication 4 MILLIGRAM(S): at 00:12

## 2022-01-07 RX ADMIN — Medication 4 MILLIGRAM(S): at 06:29

## 2022-01-07 RX ADMIN — Medication 4 MILLIGRAM(S): at 21:28

## 2022-01-07 RX ADMIN — HEPARIN SODIUM 17 UNIT(S)/HR: 5000 INJECTION INTRAVENOUS; SUBCUTANEOUS at 01:59

## 2022-01-07 RX ADMIN — LEVETIRACETAM 400 MILLIGRAM(S): 250 TABLET, FILM COATED ORAL at 17:36

## 2022-01-07 RX ADMIN — LORATADINE 10 MILLIGRAM(S): 10 TABLET ORAL at 17:38

## 2022-01-07 RX ADMIN — Medication 12.5 MILLIGRAM(S): at 06:28

## 2022-01-07 RX ADMIN — LEVETIRACETAM 400 MILLIGRAM(S): 250 TABLET, FILM COATED ORAL at 16:32

## 2022-01-07 RX ADMIN — Medication 4 MILLIGRAM(S): at 16:32

## 2022-01-07 RX ADMIN — LISINOPRIL 40 MILLIGRAM(S): 2.5 TABLET ORAL at 06:27

## 2022-01-07 RX ADMIN — HEPARIN SODIUM 18 UNIT(S)/HR: 5000 INJECTION INTRAVENOUS; SUBCUTANEOUS at 16:28

## 2022-01-07 RX ADMIN — APIXABAN 5 MILLIGRAM(S): 2.5 TABLET, FILM COATED ORAL at 17:35

## 2022-01-07 NOTE — PROGRESS NOTE ADULT - SUBJECTIVE AND OBJECTIVE BOX
HPI:  Patient is a 71 year old male with left ear audible pulsation for approximately five years.  Found to have vascular abnormality.  Now presented for treatment.    OVERNIGHT EVENTS: No acute events overnight. MRI under anesthesia    Vital Signs Last 24 Hrs  T(C): 36.4 (07 Jan 2022 15:50), Max: 37.4 (06 Jan 2022 23:43)  T(F): 97.5 (07 Jan 2022 15:50), Max: 99.3 (06 Jan 2022 23:43)  HR: 65 (07 Jan 2022 15:50) (61 - 98)  BP: 145/84 (07 Jan 2022 15:50) (124/71 - 157/85)  BP(mean): 88 (07 Jan 2022 15:15) (88 - 92)  RR: 18 (07 Jan 2022 15:50) (16 - 18)  SpO2: 96% (07 Jan 2022 15:50) (95% - 100%)    PHYSICAL EXAM:  Neurological: awake, alert, oriented x3, follows commands, speech clear and fluent, perrl, eomi, face symmetric, tongue midline, no drift b/l, moves all extremities x4 w/ 5/5 strength throughout, sensation present, intact, equal throughout    Cardiovascular: +s1, s2  Respiratory: clear to auscultation b/l  Gastrointestinal: soft, non-distended, non-tender  Genitourinary: +felder  Extremities: +dp/pt pulses palpable b/l  Incision/Wound: right groin puncture site c/d/i, no hematoma, no erythema    LABS:                        12.8   12.56 )-----------( 160      ( 06 Jan 2022 06:10 )             37.9     01-04    141  |  106  |  24<H>  ----------------------------<  187<H>  3.8   |  19<L>  |  1.08    Ca    8.1<L>      04 Jan 2022 23:43  Phos  2.2     01-04  Mg     1.8     01-04    PTT - ( 06 Jan 2022 06:08 )  PTT:43.9 sec    Allergies  No Known Allergies    MEDICATIONS  (STANDING):  apixaban 5 milliGRAM(s) Oral two times a day  dexAMETHasone     Tablet   Oral   dexAMETHasone     Tablet 4 milliGRAM(s) Oral every 8 hours  fenofibrate Tablet 145 milliGRAM(s) Oral daily  hydrochlorothiazide 12.5 milliGRAM(s) Oral daily  levETIRAcetam  IVPB 500 milliGRAM(s) IV Intermittent every 12 hours  lisinopril 40 milliGRAM(s) Oral daily  loratadine 10 milliGRAM(s) Oral daily  sodium chloride 0.9%. 1000 milliLiter(s) (75 mL/Hr) IV Continuous <Continuous>    MEDICATIONS  (PRN):    IVF:  none    CULTURES:  none    RADIOLOGY & ADDITIONAL TESTS:    < from: MR Head w/wo IV Cont (01.07.22 @ 13:48) >  INTERPRETATION:  CLINICAL INDICATION: Post embolization of dural AV   fistula      Magnetic resonance imaging of the brain was carried out with transaxial   SPGR, FLAIR, fast spin echo T2 weighted images, axial susceptibility   weighted series, diffusion weighted series and sagittal T1 weighted   series on a Erika magnet.    Comparison is made with the prior conventional angiogram of 1/4/2022.    Patient was imaged sedated and continuously monitored by the   anesthesiologist.    The fourth, third and lateral ventricles are normal in size and position.   There is no hemorrhage, mass or shift of the midline structures.   Scattered small vessel white matter ischemic changes are noted. There is   serpentine signal hyperintensity on the T2 FLAIR images in the posterior   fossa which enhance with contrast which may represent venous   hypertension. There is an embolic material in the left sigmoidand   transverse sinus. A tiny focus of diffusion restriction is identified in   the right occipital lobe.    Normal flow is identified in the right jugular vein, right sigmoid and   transverse sinus, superior sagittal sinus, inferior sagittal sinus and   internal cerebral veins.          IMPRESSION: Small vessel white matter ischemic changes. Tiny focus of   diffusion restriction right occipital lobe. Enhancing serpiginous signal   hyperintensities and T2-weighted images in the posterior fossa likely   representing venous hypertension. Embolic material left sigmoid sinus and   transverse sinus.    No intracranial hemorrhage.    < end of copied text >

## 2022-01-07 NOTE — PROGRESS NOTE ADULT - ASSESSMENT
HPI:  Patient is a 71 year old male with left ear audible pulsation for approximately five years.  Found to have vascular abnormality.  Now presented for treatment.    PROCEDURE: s/p cerebral angiogram and embolization of left transverse sinus fistula on 1/5/2022  PAD#2    PLAN:  Neuro:   -q4 hour neurovascular checks  -decadron taper  -mri as above  -keppra for seizure prophylaxis  -out of bed with assistance  -PT- no skilled needs    Respiratory:   -satting well on room air  -continue claritin  -incentive spirometer for lung expansion    CV:  -kep sbp 100-140  -lisinopril and hctz for bp control  -fenofibrate for triglyceride control    Endocrine:   -maintain euglycemia    Heme/Onc:    -stop heparin drip and start eliquis 5mg bid tonight  -scds for dvt prophylaxis    Renal:   -d/c felder and give TOV    ID:   -afebrile    GI:   -regular diet    will discuss with Dr Jerrell Rubio #[id number]

## 2022-01-07 NOTE — CONSULT NOTE ADULT - SUBJECTIVE AND OBJECTIVE BOX
HPI:   Patient is a 71y male with past medical history of HTN, migraine HA, presented with history of severe HA and double vision in [place], was admitted to [facility] had MRI/MRA which revelased cerebral aneurysm. Patient saw neurology and the patient traveled to [place] he was admitted for cerebral angiogram and embolization on 1/4/2022. The patient reports he was screened for COVID on 1/3 and was negative. He found out yesterday that his roommate tested positive for COVID, so a repeat test was done yesterday and reported to be negative. The patient reports that he is scheduled to be discharged today, he is staying at a hotel and is scheduled to leave on 1/11 via a train back to [facility]. He is vaccinated with booster given in December. He currently has no symptoms and feeling OK.     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)    H/O cholecystitis  h/o cholecystectomy        Allergies    No Known Allergies    Intolerances            FAMILY HISTORY:  FH: type 2 diabetes (Sibling)        SOCIAL HISTORY:  Smoking:     ETOH:     Drug Use:     Single     T(F): 97.8 (01-07-22 @ 07:18), Max: 99.3 (01-06-22 @ 23:43)  HR: 77 (01-07-22 @ 07:18)  BP: 137/78 (01-07-22 @ 07:18)  RR: 18 (01-07-22 @ 07:18)  SpO2: 96% (01-07-22 @ 07:18)  Wt(kg): --    PHYSICAL EXAM:  General: alert, no acute distress  Eyes:  anicteric, no conjunctival injection, no discharge  Oropharynx: no lesions or injection 	  Neck: supple, without adenopathy  Lungs: clear to auscultation  Heart: regular rate and rhythm; no murmur, rubs or gallops  Abdomen: soft, nondistended, nontender, without mass or organomegaly  Skin: no lesions  Extremities: no clubbing, cyanosis, or edema  Neurologic: alert, oriented, moves all extremities    LAB RESULTS:                        12.8   12.56 )-----------( 160      ( 06 Jan 2022 06:10 )             37.9                 MICROBIOLOGY:  RECENT CULTURES:        RADIOLOGY REVIEWED:      Antimicrobials Day #      Other Medications:  dexAMETHasone  Injectable 4 milliGRAM(s) IV Push every 6 hours  fenofibrate Tablet 145 milliGRAM(s) Oral daily  heparin  Infusion 1800 Unit(s)/Hr IV Continuous <Continuous>  hydrochlorothiazide 12.5 milliGRAM(s) Oral daily  levETIRAcetam  IVPB 500 milliGRAM(s) IV Intermittent every 12 hours  lisinopril 40 milliGRAM(s) Oral daily  loratadine 10 milliGRAM(s) Oral daily  sodium chloride 0.9%. 1000 milliLiter(s) IV Continuous <Continuous>

## 2022-01-07 NOTE — CONSULT NOTE ADULT - ASSESSMENT
71y male with past medical history of HTN, migraine HA, presented with history of severe HA and double vision in [place], [facility] [place] had MRI/MRA which revelased cerebral aneurysm. Patient saw neurology and the patient traveled to [place] he was admitted for cerebral angiogram and embolization on 1/4/2022. The patient reports he was screened for COVID on 1/3 and was negative. He found out yesterday that his roommate tested positive for COVID, so a repeat test was done yesterday and reported to be negative. The patient reports that he is scheduled to be discharged today, he is staying at a hotel and is scheduled to leave on 1/11 via a train back to [facility]. He is vaccinated with booster given in December. He currently has no symptoms and feeling OK. The patient reports he was next to the patient for the last two days prior to his roommate testing positive. he has no fevers and 96% RA   At this point as per cbc guideline for travelling, he should continue his quarantine until his departure date, of the 1/11 , he should continue to monitor himself for fevers, or other symptoms of covid, cough or sob, also ideally if he can test himself day before he leaves with a rapid test and if negative can continue with travel and wearing snug fitting mask during his whole trip.  DC planning as per neuro team

## 2022-01-08 ENCOUNTER — TRANSCRIPTION ENCOUNTER (OUTPATIENT)
Age: 72
End: 2022-01-08

## 2022-01-08 VITALS
DIASTOLIC BLOOD PRESSURE: 81 MMHG | SYSTOLIC BLOOD PRESSURE: 148 MMHG | HEART RATE: 60 BPM | OXYGEN SATURATION: 97 % | TEMPERATURE: 97 F | RESPIRATION RATE: 18 BRPM

## 2022-01-08 PROCEDURE — 99239 HOSP IP/OBS DSCHRG MGMT >30: CPT

## 2022-01-08 PROCEDURE — C1889: CPT

## 2022-01-08 PROCEDURE — 36224 PLACE CATH CAROTD ART: CPT

## 2022-01-08 PROCEDURE — 75894 X-RAYS TRANSCATH THERAPY: CPT

## 2022-01-08 PROCEDURE — 86850 RBC ANTIBODY SCREEN: CPT

## 2022-01-08 PROCEDURE — C1894: CPT

## 2022-01-08 PROCEDURE — 84100 ASSAY OF PHOSPHORUS: CPT

## 2022-01-08 PROCEDURE — 36226 PLACE CATH VERTEBRAL ART: CPT

## 2022-01-08 PROCEDURE — 86900 BLOOD TYPING SEROLOGIC ABO: CPT

## 2022-01-08 PROCEDURE — 75898 FOLLOW-UP ANGIOGRAPHY: CPT

## 2022-01-08 PROCEDURE — G0463: CPT

## 2022-01-08 PROCEDURE — 80053 COMPREHEN METABOLIC PANEL: CPT

## 2022-01-08 PROCEDURE — 83735 ASSAY OF MAGNESIUM: CPT

## 2022-01-08 PROCEDURE — 93005 ELECTROCARDIOGRAM TRACING: CPT

## 2022-01-08 PROCEDURE — 70553 MRI BRAIN STEM W/O & W/DYE: CPT

## 2022-01-08 PROCEDURE — 80048 BASIC METABOLIC PNL TOTAL CA: CPT

## 2022-01-08 PROCEDURE — C1769: CPT

## 2022-01-08 PROCEDURE — 36415 COLL VENOUS BLD VENIPUNCTURE: CPT

## 2022-01-08 PROCEDURE — C9399: CPT

## 2022-01-08 PROCEDURE — A9585: CPT

## 2022-01-08 PROCEDURE — C2628: CPT

## 2022-01-08 PROCEDURE — 97162 PT EVAL MOD COMPLEX 30 MIN: CPT

## 2022-01-08 PROCEDURE — 36227 PLACE CATH XTRNL CAROTID: CPT

## 2022-01-08 PROCEDURE — U0003: CPT

## 2022-01-08 PROCEDURE — C1773: CPT

## 2022-01-08 PROCEDURE — C9803: CPT

## 2022-01-08 PROCEDURE — 61624 TCAT PERM OCCLS/EMBOLJ CNS: CPT

## 2022-01-08 PROCEDURE — C1887: CPT

## 2022-01-08 PROCEDURE — 85730 THROMBOPLASTIN TIME PARTIAL: CPT

## 2022-01-08 PROCEDURE — C1760: CPT

## 2022-01-08 PROCEDURE — U0005: CPT

## 2022-01-08 PROCEDURE — 85027 COMPLETE CBC AUTOMATED: CPT

## 2022-01-08 PROCEDURE — 86901 BLOOD TYPING SEROLOGIC RH(D): CPT

## 2022-01-08 RX ORDER — DEXAMETHASONE 0.5 MG/5ML
1 ELIXIR ORAL
Qty: 6 | Refills: 0
Start: 2022-01-08

## 2022-01-08 RX ORDER — LEVETIRACETAM 250 MG/1
1 TABLET, FILM COATED ORAL
Qty: 30 | Refills: 0
Start: 2022-01-08

## 2022-01-08 RX ORDER — LEVETIRACETAM 250 MG/1
5 TABLET, FILM COATED ORAL
Qty: 0 | Refills: 0 | DISCHARGE
Start: 2022-01-08

## 2022-01-08 RX ORDER — APIXABAN 2.5 MG/1
1 TABLET, FILM COATED ORAL
Qty: 60 | Refills: 0
Start: 2022-01-08

## 2022-01-08 RX ADMIN — Medication 12.5 MILLIGRAM(S): at 05:31

## 2022-01-08 RX ADMIN — APIXABAN 5 MILLIGRAM(S): 2.5 TABLET, FILM COATED ORAL at 05:30

## 2022-01-08 RX ADMIN — LEVETIRACETAM 400 MILLIGRAM(S): 250 TABLET, FILM COATED ORAL at 05:29

## 2022-01-08 RX ADMIN — Medication 4 MILLIGRAM(S): at 05:31

## 2022-01-08 RX ADMIN — LISINOPRIL 40 MILLIGRAM(S): 2.5 TABLET ORAL at 05:30

## 2022-01-08 NOTE — DISCHARGE NOTE PROVIDER - NSDCCPCAREPLAN_GEN_ALL_CORE_FT
PRINCIPAL DISCHARGE DIAGNOSIS  Diagnosis: Cerebrovascular dural AV fistula  Assessment and Plan of Treatment: s/p cerebral angiogram and embolization of left transverse sinus fistula on 1/5/2022.  Dr Allan- neurosurgeon- please call office for appointment in 1-2 weeks.   PCP within 1-2 weeks.      SECONDARY DISCHARGE DIAGNOSES  Diagnosis: Hyperlipidemia  Assessment and Plan of Treatment: Please continue medications and follow up with your primary care physician within 1-2 weeks.    Diagnosis: HTN (hypertension)  Assessment and Plan of Treatment: Please continue medications and follow up with your primary care physician within 1-2 weeks.

## 2022-01-08 NOTE — DISCHARGE NOTE NURSING/CASE MANAGEMENT/SOCIAL WORK - PATIENT PORTAL LINK FT
You can access the FollowMyHealth Patient Portal offered by [facility] by registering at the following website: [email or web address]. By joining [facility]’s FollowMyHealth portal, you will also be able to view your health information using other applications (apps) compatible with our system.

## 2022-01-08 NOTE — PROGRESS NOTE ADULT - ASSESSMENT
HPI:  Patient is a 71 year old male with left ear audible pulsation for approximately five years.  Found to have vascular abnormality.  Now presented for treatment.    PROCEDURE: s/p cerebral angiogram and embolization of left transverse sinus fistula on 1/5/2022  PAD#3    PLAN:  Neuro:   -q4 hour neurovascular checks  -decadron taper  -mri as above  -keppra for seizure prophylaxis  -out of bed with assistance  -PT- no skilled needs    Respiratory:   -satting well on room air  -continue claritin  -incentive spirometer for lung expansion    CV:  -kep sbp 100-140  -lisinopril and hctz for bp control  -fenofibrate for triglyceride control    Endocrine:   -maintain euglycemia    Heme/Onc:    -stop heparin drip and start eliquis 5mg bid tonight  -scds for dvt prophylaxis    Renal:   -voiding    ID:   -afebrile    GI:   -regular diet    d/c IVL and d/c home today  Discussed with patient and family wound care, follow up plans, activity, and medications. Questions answered, and they verbalized understanding.   [facility]s sent to [facility]    will discuss with Dr Jerrell Rubio #[id number]

## 2022-01-08 NOTE — DISCHARGE NOTE PROVIDER - NSDCMRMEDTOKEN_GEN_ALL_CORE_FT
Allegra-D 12 Hour: 1  orally , As Needed  apixaban 5 mg oral tablet: 1 tab(s) orally 2 times a day  Claritin-D 12 Hour: 1  orally , As Needed  dexamethasone 2 mg oral tablet: 1 tab(s) orally 3x a day sat, 1 tab PO 2x daily sun, and 1 tab daily in am mon  fenofibrate 145 mg oral tablet: 1 tab(s) orally once a day  hydroCHLOROthiazide 12.5 mg oral tablet: 1 tab(s) orally once a day  hydrocodone- acetamenophen: 1 tab(s) orally 3 times a day, As Needed  levETIRAcetam 100 mg/mL intravenous solution: 5 milliliter(s) intravenous every 12 hours  lisinopril 40 mg oral tablet: 1 tab(s) orally once a day  Multiple Vitamins oral tablet: 1 tab(s) orally once a day  ZyrTEC 10 mg oral tablet: 1  orally , As Needed   Allegra-D 12 Hour: 1  orally , As Needed  apixaban 5 mg oral tablet: 1 tab(s) orally 2 times a day  Claritin-D 12 Hour: 1  orally , As Needed  dexamethasone 2 mg oral tablet: 1 tab(s) orally 3x a day sat, 1 tab PO 2x daily sun, and 1 tab daily in am mon  fenofibrate 145 mg oral tablet: 1 tab(s) orally once a day  hydroCHLOROthiazide 12.5 mg oral tablet: 1 tab(s) orally once a day  hydrocodone- acetamenophen: 1 tab(s) orally 3 times a day, As Needed  levETIRAcetam 500 mg oral tablet: 1 tab(s) orally 2 times a day  lisinopril 40 mg oral tablet: 1 tab(s) orally once a day  Multiple Vitamins oral tablet: 1 tab(s) orally once a day  ZyrTEC 10 mg oral tablet: 1  orally , As Needed

## 2022-01-08 NOTE — DISCHARGE NOTE PROVIDER - HOSPITAL COURSE
HPI:  72 yo M  with HTN, migraine HA- c/o left ear audible pulsation since 2015 c/o severe HA & double vision in 11/2021. Pt had Ed admission in [place]-  S/p MRI/MRA revealed  cerebral aneurysm. Pt had neurology consult- scheduled for cerebral angiogram& embolization on 1/4/2022  **pt denies any fever, chills, CP/SOB or sick contacts. **Covid 19 PCR swab done on 1/3/22 @ [facility] (03 Jan 2022 18:27)    Patient admitted and underwent s/p cerebral angiogram and embolization of left transverse sinus fistula on 1/5/2022. Post procedure he did well. PT evaluated him and recommended no skilled needs. On the day of discharge he is medically and neurologically stable for discharge to home. During his hospitalization he had an exposure to COVID + roommate. He was seen in consultation by ID and explained quarantine procedure and to monitor for symptoms.     More than 30 minutes were spent educating the patient and family regarding condition, medications, follow up plans, signs and symptoms to be concerned with, preparing paperwork, and questions answered regarding discharge.

## 2022-01-08 NOTE — DISCHARGE NOTE PROVIDER - CARE PROVIDERS DIRECT ADDRESSES
[email or web address]

## 2022-01-08 NOTE — DISCHARGE NOTE NURSING/CASE MANAGEMENT/SOCIAL WORK - NSDCPEFALRISK_GEN_ALL_CORE
For information on Fall & Injury Prevention, visit: [email or web address] OR  [email or web address] OR  https://www.cdc.gov/steadi/patient.html

## 2022-01-08 NOTE — PROGRESS NOTE ADULT - SUBJECTIVE AND OBJECTIVE BOX
HPI:  Patient is a 71 year old male with left ear audible pulsation for approximately five years.  Found to have vascular abnormality.  Now presented for treatment.    Vital Signs Last 24 Hrs  T(C): 36.3 (08 Jan 2022 07:54), Max: 36.6 (07 Jan 2022 23:40)  T(F): 97.4 (08 Jan 2022 07:54), Max: 97.9 (07 Jan 2022 23:40)  HR: 60 (08 Jan 2022 07:54) (60 - 95)  BP: 148/81 (08 Jan 2022 07:54) (121/62 - 148/81)  BP(mean): --  RR: 18 (08 Jan 2022 07:54) (17 - 18)  SpO2: 97% (08 Jan 2022 07:54) (95% - 97%)    PHYSICAL EXAM:  Neurological: awake, alert, oriented x3, follows commands, speech clear and fluent, perrl, eomi, face symmetric, tongue midline, no drift b/l, moves all extremities x4 w/ 5/5 strength throughout, sensation present, intact, equal throughout    Cardiovascular: +s1, s2  Respiratory: clear to auscultation b/l  Gastrointestinal: soft, non-distended, non-tender  Genitourinary: +felder  Extremities: +dp/pt pulses palpable b/l  Incision/Wound: right groin puncture site c/d/i, no hematoma, no erythema    LABS:                        12.8   12.56 )-----------( 160      ( 06 Jan 2022 06:10 )             37.9     01-04    141  |  106  |  24<H>  ----------------------------<  187<H>  3.8   |  19<L>  |  1.08    Ca    8.1<L>      04 Jan 2022 23:43  Phos  2.2     01-04  Mg     1.8     01-04    PTT - ( 06 Jan 2022 06:08 )  PTT:43.9 sec    Allergies  No Known Allergies    MEDICATIONS  (STANDING):  apixaban 5 milliGRAM(s) Oral two times a day  dexAMETHasone     Tablet   Oral   dexAMETHasone     Tablet 4 milliGRAM(s) Oral every 8 hours  fenofibrate Tablet 145 milliGRAM(s) Oral daily  hydrochlorothiazide 12.5 milliGRAM(s) Oral daily  levETIRAcetam  IVPB 500 milliGRAM(s) IV Intermittent every 12 hours  lisinopril 40 milliGRAM(s) Oral daily  loratadine 10 milliGRAM(s) Oral daily  sodium chloride 0.9%. 1000 milliLiter(s) (75 mL/Hr) IV Continuous <Continuous>    MEDICATIONS  (PRN):    IVF:  none    CULTURES:  none    RADIOLOGY & ADDITIONAL TESTS:    < from: MR Head w/wo IV Cont (01.07.22 @ 13:48) >  INTERPRETATION:  CLINICAL INDICATION: Post embolization of dural AV   fistula      Magnetic resonance imaging of the brain was carried out with transaxial   SPGR, FLAIR, fast spin echo T2 weighted images, axial susceptibility   weighted series, diffusion weighted series and sagittal T1 weighted   series on a Erika magnet.    Comparison is made with the prior conventional angiogram of 1/4/2022.    Patient was imaged sedated and continuously monitored by the   anesthesiologist.    The fourth, third and lateral ventricles are normal in size and position.   There is no hemorrhage, mass or shift of the midline structures.   Scattered small vessel white matter ischemic changes are noted. There is   serpentine signal hyperintensity on the T2 FLAIR images in the posterior   fossa which enhance with contrast which may represent venous   hypertension. There is an embolic material in the left sigmoidand   transverse sinus. A tiny focus of diffusion restriction is identified in   the right occipital lobe.    Normal flow is identified in the right jugular vein, right sigmoid and   transverse sinus, superior sagittal sinus, inferior sagittal sinus and   internal cerebral veins.          IMPRESSION: Small vessel white matter ischemic changes. Tiny focus of   diffusion restriction right occipital lobe. Enhancing serpiginous signal   hyperintensities and T2-weighted images in the posterior fossa likely   representing venous hypertension. Embolic material left sigmoid sinus and   transverse sinus.    No intracranial hemorrhage.    < end of copied text >

## 2022-01-08 NOTE — DISCHARGE NOTE PROVIDER - CARE PROVIDER_API CALL
Albert Allan)  Neurosurgery  [address], [address]  [place] [address]  Phone: [phone]  Fax: [phone]  Follow Up Time:

## 2022-01-08 NOTE — DISCHARGE NOTE PROVIDER - NSDCFUADDINST_GEN_ALL_CORE_FT
please notify physician if fevers, bleeding, swelling, pain not relieved by medication, increased sluggishness or irritability, increased nausea or vomiting, inability to tolerate foods or liquids, new or increasing weakness/numbness/tingling.   please do not engage in strenuous activity, heavy lifting, drive, or return to work or school until cleared by surgeon.

## 2022-04-07 ENCOUNTER — HISTORICAL (OUTPATIENT)
Dept: ADMINISTRATIVE | Facility: HOSPITAL | Age: 72
End: 2022-04-07

## 2022-04-23 VITALS
HEIGHT: 74 IN | DIASTOLIC BLOOD PRESSURE: 80 MMHG | WEIGHT: 249.56 LBS | SYSTOLIC BLOOD PRESSURE: 139 MMHG | BODY MASS INDEX: 32.03 KG/M2 | OXYGEN SATURATION: 98 %

## 2022-05-01 NOTE — HISTORICAL [facility]ER
This is a historical note converted from Kennedi. Formatting and pictures may have been removed.  Please reference Kennedi for original formatting and attached multimedia. Chief Complaint  Wellness visit  History of Present Illness  67 y/o male here for annual wellness  HTN, HLD, thyroid goiter, polyps  saw Dr. Sixto nuñez, cardiology  has not seen endocrinology recently  eye exam- dr. Hernandez  mild nocturia  occasional fever blisters  ?  had flu vaccine earlier this year  ?  ?  ?  ?  ?  ?  Dr. Villanueva hx:  HEENT -Dr. Hernandez-yearly-?? eye exam  ??? Migraines-none.  CHEST? - no SOB  C/V -Dr. Henson-HBP/Hyperchol/Hypertrig/left carotid bruit-normal CTA neck  GI? - Dr. Diamond colonoscope 10/9/18 polyp - repeat 5 yrs.  ??? Dr MARKO Swanson -2002 EGD GERD   - nocturia x 1  M/S -negative  ENDOCRINE - Dr. EDIE Baig-yearly-- multinodular goiter/hyperglycemia?? TSH there  Review of Systems  Constitutional: No fever, No chills, No sweats, No weakness, No fatigue  ?????Eye: No blurring, No double vision.  ?????Ear/Nose/Mouth/Throat: No nasal congestion, No sore throat.  ?????Respiratory: No shortness of breath, No cough, No wheezing, No cyanosis.  ?????Cardiovascular: No chest pain, No palpitations, No bradycardia, No tachycardia, No peripheral edema.  ?????Gastrointestinal: No nausea, No vomiting, No diarrhea, No constipation, No abdominal pain.  ?????Genitourinary: nocturia, No dysuria, No hematuria.  ?????Musculoskeletal: No back pain, No neck pain, No joint pain, No muscle pain, No claudication.  ?????Integumentary: No rash.  ?????Neurologic: No abnormal balance, No confusion, No numbness, No tingling, No headache.  ?????Psychiatric: No anxiety, No depression.  Physical Exam  Vitals & Measurements  HR:?82(Peripheral)? BP:?120/78? SpO2:?99%?  HT:?187?cm? WT:?108.1?kg? BMI:?30.91?  ????General: Alert and oriented, No acute distress.  ?????Eye: Extraocular movements are intact, Normal conjunctiva.  ?????HENT: Normocephalic,?  Oral mucosa is moist.  ?????Neck: Supple, Non-tender, carotid bruit, No jugular venous distention, No lymphadenopathy,?thyroid nodule  ?????Respiratory: Lungs are clear to auscultation, Respirations are non-labored, Breath sounds are equal, Symmetrical chest wall expansion, No chest wall tenderness.  ?????Cardiovascular: Normal rate, Regular rhythm, No gallop, Good pulses equal in all extremities, Normal peripheral perfusion, No edema.  ?????Gastrointestinal: Soft, Non-tender, Non-distended, Normal bowel sounds.  ?????Musculoskeletal: Normal range of motion, Normal gait.  ?????Integumentary: Warm, Dry, Intact.  ?????Neurologic: Alert, Oriented, No focal deficits.  ?????Psychiatric: Cooperative, Appropriate mood & affect, Normal judgment, Non-suicidal.  Assessment/Plan  1.?Wellness examination?Z00.00  ?  2.?Hypertension?I10  Ordered:  Comprehensive Metabolic Panel, Routine collect, 11/20/19 9:55:00 CST, Blood, Order for future visit, Stop date 11/20/19 9:55:00 CST, Lab Collect, Hypercholesteremia  Hypertension, 11/20/19 9:55:00 CST  Free T4, Routine collect, 11/20/19 9:55:00 CST, Blood, Order for future visit, Stop date 11/20/19 9:55:00 CST, Lab Collect, Multinodular goiter  Hypertension, 11/20/19 9:55:00 CST  Lipid Panel, Routine collect, 11/20/19 9:55:00 CST, Blood, Order for future visit, Stop date 11/20/19 9:55:00 CST, Lab Collect, Hypercholesteremia  Hypertension, 11/20/19 9:55:00 CST  T3 Free, Routine collect, 11/20/19 9:55:00 CST, Blood, Order for future visit, Stop date 11/20/19 9:55:00 CST, Lab Collect, Multinodular goiter  Hypertension, 11/20/19 9:55:00 CST  Thyroid Stimulating Hormone, Routine collect, 11/20/19 9:55:00 CST, Blood, Order for future visit, Stop date 11/20/19 9:55:00 CST, Lab Collect, Multinodular goiter  Hypertension, 11/20/19 9:55:00 CST  ?  3.?Hyperglycemia?R73.9  ?  4.?Multinodular goiter?E04.2  Ordered:  Free T4, Routine collect, 11/20/19 9:55:00 CST, Blood, Order for future visit,  Stop date 11/20/19 9:55:00 CST, Lab Collect, Multinodular goiter  Hypertension, 11/20/19 9:55:00 CST  T3 Free, Routine collect, 11/20/19 9:55:00 CST, Blood, Order for future visit, Stop date 11/20/19 9:55:00 CST, Lab Collect, Multinodular goiter  Hypertension, 11/20/19 9:55:00 CST  Thyroid Stimulating Hormone, Routine collect, 11/20/19 9:55:00 CST, Blood, Order for future visit, Stop date 11/20/19 9:55:00 CST, Lab Collect, Multinodular goiter  Hypertension, 11/20/19 9:55:00 CST  ?  5.?Hypercholesteremia?E78.00  Ordered:  Comprehensive Metabolic Panel, Routine collect, 11/20/19 9:55:00 CST, Blood, Order for future visit, Stop date 11/20/19 9:55:00 CST, Lab Collect, Hypercholesteremia  Hypertension, 11/20/19 9:55:00 CST  Lipid Panel, Routine collect, 11/20/19 9:55:00 CST, Blood, Order for future visit, Stop date 11/20/19 9:55:00 CST, Lab Collect, Hypercholesteremia  Hypertension, 11/20/19 9:55:00 CST  ?  6.?Colon polyp?K63.5  ?  7.?Chronic GERD?K21.9  ?  8.?Carotid bruit?R09.89  ?  9.?Nocturia?R35.1  Ordered:  Prostate Specific Antigen, Routine collect, 11/20/19 9:57:00 CST, Blood, Order for future visit, Stop date 11/20/19 9:57:00 CST, Lab Collect, Nocturia, 11/20/19 9:57:00 CST  ?  fasting lab collection  encouarge exercise, low sodium  had flu vaccine  annual eye exam dr. Hernandez  follow up 6months, sooner as needed  ?   Problem List/Past Medical History  Ongoing  Chronic GERD  Colon polyp  Hypercholesteremia  Hyperglycemia  Hypertension  Hypertriglyceridemia  Left carotid bruit  Migraines  Multinodular goiter  Nocturia  Historical  can walk 2 blocks briskly without shortness of breath or chest pain  Fracture  Procedure/Surgical History  Hernia Repair Umbilical (None) (10/23/2013)  Other and open repair of umbilical hernia with graft or prosthesis (10/23/2013)  Repair umbilical hernia, age 5 years or older; reducible. 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  Cholecystectomy    Medications  aspirin 81 mg oral Delayed Release (EC) tablet, Oral, qPM  lisinopril 5 mg oral tablet, 5 mg= 1 tab(s), Oral, Daily, 2 refills  multivitamin with minerals (Adult Tab), Oral, Daily  ondansetron  PRAVASTATIN TAB 80MG  Toradol, 30 mg= 1 mL, IV Push, Once  TriCor 145 mg oral tablet, 145 mg= 1 tab(s), Oral, Daily  valACYclovir 1 g oral tablet, 1 gm= 1 tab(s), Oral, BID  Allergies  cortisone?(Hives, Swelling)  Social History  Abuse/Neglect  No, 11/20/2019  Alcohol - Denies Alcohol Use, 10/15/2013  Never, 08/20/2018  Employment/School  Retired, Work/School description: teacher. Highest education level: Post graduate degree(s)., 08/20/2018  Home/Environment  Lives with Spouse., 10/15/2013  Tobacco  Former smoker, quit more than 30 days ago, No, 11/20/2019  Former smoker, No, 11/19/2018  Past, Cigarettes, 10/15/2013  Family History  Anemia: Father.  Coronary artery disease: Brother.  Diabetes mellitus type 2: Father, Brother and Brother.  Drug addiction.: Brother.  Hypercholesterolaemia: Brother.  Hypertension.: Father.  Hypertriglyceridemia: Brother.  Pacemaker rhythm: Father.  Pneumonia.: Mother.  Brother: History is negative  Immunizations  Vaccine Date Status Comments   influenza virus vaccine, inactivated 11/08/2019 Given    influenza virus vaccine, inactivated 11/19/2018 Given    zoster vaccine, inactivated 08/06/2018 Recorded [8/20/2018] Shingrix   pneumococcal 23-polyvalent vaccine 11/16/2017 Recorded    influenza virus vaccine, inactivated 10/05/2017 Recorded    pneumococcal 13-valent conjugate vaccine 04/03/2016 Recorded    zoster vaccine live 07/22/2011 Recorded [8/20/2018] Zostavax   Health Maintenance  Health Maintenance  ???Pending?(in the next year)  ??? ??OverDue  ??? ? ? ?Pneumococcal Vaccine due??and every?  ??? ? ? ?Advance Directive due??01/01/19??and every 1??year(s)  ??? ? ? ?Geriatric Depression Screening due??01/01/19??and every 1??year(s)  ??? ? ? ?Hypertension Management-BMP  due??11/19/19??and every 1??year(s)  ??? ??Due?  ??? ? ? ?Tetanus Vaccine due??11/20/19??and every 10??year(s)  ??? ??Due In Future?  ??? ? ? ?Alcohol Misuse Screening not due until??01/01/20??and every 1??year(s)  ??? ? ? ?Cognitive Screening not due until??01/01/20??and every 1??year(s)  ??? ? ? ?Fall Risk Assessment not due until??01/01/20??and every 1??year(s)  ??? ? ? ?Functional Assessment not due until??01/01/20??and every 1??year(s)  ??? ? ? ?Obesity Screening not due until??01/01/20??and every 1??year(s)  ??? ? ? ?Hypertension Management-Blood Pressure not due until??11/19/20??and every 1??year(s)  ???Satisfied?(in the past 1 year)  ??? ??Satisfied?  ??? ? ? ?ADL Screening on??11/20/19.??Satisfied by Yaneth Cruz MD  ??? ? ? ?Alcohol Misuse Screening on??11/20/19.??Satisfied by Yaneth Cruz MD  ??? ? ? ?Aspirin Therapy for CVD Prevention on??11/20/19.??Satisfied by Yaneth Cruz MD  ??? ? ? ?Blood Pressure Screening on??11/20/19.??Satisfied by Catrina Frey CMA.  ??? ? ? ?Body Mass Index Check on??11/20/19.??Satisfied by Catrina Frey CMA.  ??? ? ? ?Cognitive Screening on??11/20/19.??Satisfied by Yaneth Cruz MD  ??? ? ? ?Fall Risk Assessment on??11/20/19.??Satisfied by Yaneth Cruz MD  ??? ? ? ?Functional Assessment on??11/20/19.??Satisfied by Yaneth Cruz MD  ??? ? ? ?Hypertension Management-Education on??11/20/19.??Satisfied by Yaneth Cruz MD  ??? ? ? ?Hypertension Management-Blood Pressure on??11/20/19.??Satisfied by Catrina Frey CMA  ??? ? ? ?Influenza Vaccine on??11/08/19.??Satisfied by Catrina Frey CMA  ??? ? ? ?Obesity Screening on??11/20/19.??Satisfied by Catrina Frey CMA  ?

## 2022-05-01 NOTE — HISTORICAL [facility]
This is a historical note converted from Kennedi. Formatting and pictures may have been removed.  Please reference Kennedi for original formatting and attached multimedia. Chief Complaint  6 month follow up. HTN, Hyperglycemia, Hypercholesterolemia  History of Present Illness  ?  68 y/o male here for follow up.  says he has been staying home.  eating more, his wife loves to cook, eating more sweets  has been walking daily  needs refill of medication for fever blisters  ?  ?   11/19  here for annual wellness  previous pt of Dr. Villanueva  HTN, HLD, thyroid goiter, polyps  saw Dr. Sixto nuñez, cardiology  has not seen endocrinology recently  eye exam- dr. Hernandez  mild nocturia  occasional fever blisters  ?   had flu vaccine earlier this year  ?  ?  ?  ?  ?  ?  Dr. Villanueva hx:  HEENT -Dr. Hernandez-yearly-?? eye exam  ??? Migraines-none.  CHEST? - no SOB  C/V -Dr. Henson-HBP/Hyperchol/Hypertrig/left carotid bruit-normal CTA neck  GI? - Dr. Diamond colonoscope 10/9/18 polyp - repeat 5 yrs.  ??? Dr MARKO Swanson -2002 EGD GERD   - nocturia x 1  M/S -negative  ENDOCRINE - Dr. EDIE Baig-yearly-- multinodular goiter/hyperglycemia?? TSH there  Review of Systems  Constitutional: No fever, No chills, No sweats, No weakness, No fatigue  ?????Eye: No blurring, No double vision.  ?????Ear/Nose/Mouth/Throat: No nasal congestion, No sore throat.  ?????Respiratory: No shortness of breath, No cough, No wheezing, No cyanosis.  ?????Cardiovascular: No chest pain, No palpitations, No bradycardia, No tachycardia, No peripheral edema.  ?????Gastrointestinal: No nausea, No vomiting, No diarrhea, No constipation, No abdominal pain.  ?????Genitourinary: No dysuria, No hematuria.  ?????Musculoskeletal: No back pain, No neck pain, No joint pain, No muscle pain, No claudication.  ?????Integumentary: No rash.  ?????Neurologic:No abnormal balance, No confusion, No numbness, No tingling, No headache.  ?????Psychiatric: No anxiety, No  depression.  Physical Exam  Vitals & Measurements  T:?37.0? ?C (Oral)? HR:?72(Peripheral)? BP:?130/82? SpO2:?99%?  HT:?187?cm? WT:?113.6?kg? BMI:?32.49?  ????General: Alert and oriented, No acute distress.  ?????Eye: Extraocular movements are intact, Normal conjunctiva.  ?????HENT: Normocephalic,  ?????Respiratory: Respirations are non-labored, Symmetrical chest wall expansion  ?????Cardiovascular: Normal rate, Regular rhythm, No gallop  ???? Musculoskeletal: Normal range of motion, Normal gait.  ?????Integumentary: Warm, Dry, Intact.  ?????Neurologic: Alert, Oriented, No focal deficits.  ?????Psychiatric: Cooperative, Appropriate mood & affect, Normal judgment, Non-suicidal.  Assessment/Plan  1.?Hypertension?I10  Ordered:  Basic Metabolic Panel, Routine collect, 05/20/20 8:51:00 CDT, Blood, Order for future visit, Stop date 05/20/20 8:51:00 CDT, Lab Collect, Hypertension, 05/20/20 8:51:00 CDT  Lab Collection Request, 05/20/20 8:51:00 CDT, [facility] AMB - AFP, 05/20/20 8:51:00 CDT, Hypertension  Hyperglycemia  ?  2.?Hypercholesteremia?E78.00  ?  3.?Hyperglycemia?R73.9  Ordered:  Hemoglobin A1c, Routine collect, 05/20/20 8:51:00 CDT, Blood, Order for future visit, Stop date 05/20/20 8:51:00 CDT, Lab Collect, Hyperglycemia, 05/20/20 8:51:00 CDT  Lab Collection Request, 05/20/20 8:51:00 CDT, [facility] AMB - AFP, 05/20/20 8:51:00 CDT, Hypertension  Hyperglycemia  ?  4.?Colon polyp?K63.5  ?  5.?Chronic GERD?K21.9  ?  6.?Multinodular goiter?E04.2  ?  7.?Hypertriglyceridemia?E78.1  ?  Orders:  valACYclovir, 1 gm = 1 tab(s), Oral, BID, PRN PRN fever blisters, drink plenty of fluids, # 30 tab(s), 0 Refill(s), Pharmacy: [id number], 187, cm, Height/Length Dosing, 05/20/20 8:38:00 CDT, 113.6, kg, Weight Dosing, 05/20/20 8:38:00 CDT  A1C, BMP  continue exercise  low carb, low sodium  wellness due Nov, sooner follow up prn and pending results   Problem List/Past Medical History  Ongoing  Chronic GERD  Colon  polyp  Hypercholesteremia  Hyperglycemia  Hypertension  Hypertriglyceridemia  Left carotid bruit  Migraines  Multinodular goiter  Nocturia  Historical  can walk 2 blocks briskly without shortness of breath or chest pain  Fracture  Procedure/Surgical History  Hernia Repair Umbilical (None) (10/23/2013)  Other and open repair of umbilical hernia with graft or prosthesis (10/23/2013)  Repair umbilical hernia, age 5 years or older; reducible. 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  Cholecystectomy   Medications  aspirin 81 mg oral Delayed Release (EC) tablet, Oral, qPM  fenofibrate 145 mg oral tablet, See Instructions  fenofibrate 145 mg oral tablet, See Instructions  lisinopril 5 mg oral tablet, 5 mg= 1 tab(s), Oral, Daily, 2 refills  multivitamin with minerals (Adult Tab), Oral, Daily  ondansetron  PRAVASTATIN TAB 80MG  Toradol, 30 mg= 1 mL, IV Push, Once  valACYclovir 1 g oral tablet, 1 gm= 1 tab(s), Oral, BID, PRN  Allergies  cortisone?(Hives, Swelling)  Social History  Abuse/Neglect  No, 05/20/2020  No, 11/20/2019  Alcohol - Denies Alcohol Use, 10/15/2013  Never, 08/20/2018  Employment/School  Retired, 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  Coronary artery disease: Brother.  Diabetes mellitus type 2: Father, Brother and Brother.  Drug addiction.: Brother.  Hypercholesterolaemia: Brother.  Hypertension.: Father.  Hypertriglyceridemia: Brother.  Pacemaker rhythm: Father.  Pneumonia.: Mother.  Brother: History is negative  Immunizations  Vaccine Date Status Comments   influenza virus vaccine, inactivated 11/08/2019 Given    influenza virus vaccine, inactivated 11/19/2018  Given    zoster vaccine, inactivated 08/06/2018 Recorded [8/20/2018] Shingrix   pneumococcal 23-polyvalent vaccine 11/16/2017 Recorded    influenza virus vaccine, inactivated 10/05/2017 Recorded    pneumococcal 13-valent conjugate vaccine 04/03/2016 Recorded    zoster vaccine live 07/22/2011 Recorded [8/20/2018] Zostavax   Health Maintenance  Health Maintenance  ???Pending?(in the next year)  ??? ??OverDue  ??? ? ? ?Pneumococcal Vaccine due??and every?  ??? ? ? ?Advance Directive due??01/01/20??and every 1??year(s)  ??? ??Due?  ??? ? ? ?Tetanus Vaccine due??05/20/20??and every 10??year(s)  ??? ??Due In Future?  ??? ? ? ?Hypertension Management-BMP not due until??11/19/20??and every 1??year(s)  ??? ? ? ?ADL Screening not due until??11/20/20??and every 1??year(s)  ??? ? ? ?Medicare Annual Wellness Exam not due until??11/20/20??and every 1??year(s)  ??? ? ? ?Aspirin Therapy for CVD Prevention not due until??11/20/20??and every 1??year(s)  ??? ? ? ?Hypertension Management-Education not due until??11/20/20??and every 1??year(s)  ??? ? ? ?Alcohol Misuse Screening not due until??01/01/21??and every 1??year(s)  ??? ? ? ?Cognitive Screening not due until??01/01/21??and every 1??year(s)  ??? ? ? ?Fall Risk Assessment not due until??01/01/21??and every 1??year(s)  ??? ? ? ?Functional Assessment not due until??01/01/21??and every 1??year(s)  ??? ? ? ?Obesity Screening not due until??01/01/21??and every 1??year(s)  ???Satisfied?(in the past 1 year)  ??? ??Satisfied?  ??? ? ? ?ADL Screening on??11/20/19.??Satisfied by Yaneth Cruz MD  ??? ? ? ?Alcohol Misuse Screening on??05/20/20.??Satisfied by Yaneth Cruz MD  ??? ? ? ?Aspirin Therapy for CVD Prevention on??11/20/19.??Satisfied by Yaneth Cruz MD  ??? ? ? ?Blood Pressure Screening on??05/20/20.??Satisfied by Catrina Frey CMA  ??? ? ? ?Body Mass Index Check on??05/20/20.??Satisfied by Catrina Frey CMA  ??? ? ? ?Cognitive Screening on??11/20/19.??Satisfied  by Yaneth Cruz MD  ??? ? ? ?Diabetes Screening on??11/20/19.??Satisfied by Raulito Houser  ??? ? ? ?Fall Risk Assessment on??11/20/19.??Satisfied by Yaneth Cruz MD  ??? ? ? ?Functional Assessment on??11/20/19.??Satisfied by Yaneth Cruz MD  ??? ? ? ?Hypertension Management-Blood Pressure on??05/20/20.??Satisfied by Catrina Frey CMA.  ??? ? ? ?Hypertension Management-BMP on??11/20/19.??Satisfied by Raulito Houser  ??? ? ? ?Hypertension Management-Education on??11/20/19.??Satisfied by Yaneth Cruz MD  ??? ? ? ?Influenza Vaccine on??11/08/19.??Satisfied by Catrina Frey CMA.  ??? ? ? ?Lipid Screening on??11/20/19.??Satisfied by Raulito Houser  ??? ? ? ?Medicare Annual Wellness Exam on??11/20/19.??Satisfied by Yaneth Cruz MD  ??? ? ? ?Obesity Screening on??05/20/20.??Satisfied by Catrina Frey CMA  ?

## 2023-01-30 PROBLEM — J30.2 OTHER SEASONAL ALLERGIC RHINITIS: Chronic | Status: ACTIVE | Noted: 2022-01-03

## 2023-01-30 PROBLEM — G43.909 MIGRAINE, UNSPECIFIED, NOT INTRACTABLE, WITHOUT STATUS MIGRAINOSUS: Chronic | Status: ACTIVE | Noted: 2022-01-03

## 2023-01-30 PROBLEM — E78.5 HYPERLIPIDEMIA, UNSPECIFIED: Chronic | Status: ACTIVE | Noted: 2022-01-03

## 2023-01-30 PROBLEM — Z86.19 PERSONAL HISTORY OF OTHER INFECTIOUS AND PARASITIC DISEASES: Chronic | Status: ACTIVE | Noted: 2022-01-03

## 2023-01-30 PROBLEM — Q28.2 ARTERIOVENOUS MALFORMATION OF CEREBRAL VESSELS: Chronic | Status: ACTIVE | Noted: 2022-01-03

## 2023-01-30 PROBLEM — I10 ESSENTIAL (PRIMARY) HYPERTENSION: Chronic | Status: ACTIVE | Noted: 2022-01-03

## 2023-02-08 NOTE — PRE-ANESTHESIA EVALUATION ADULT - NSANTHOSAYNRD_GEN_A_CORE
No. PURVI screening performed.  STOP BANG Legend: 0-2 = LOW Risk; 3-4 = INTERMEDIATE Risk; 5-8 = HIGH Risk
No. PURVI screening performed.  STOP BANG Legend: 0-2 = LOW Risk; 3-4 = INTERMEDIATE Risk; 5-8 = HIGH Risk
Lip Wedge Excision Repair Text: Given the location of the defect and the proximity to free margins a full thickness wedge repair was deemed most appropriate.  Using a sterile surgical marker, the appropriate repair was drawn incorporating the defect and placing the expected incisions perpendicular to the vermilion border.  The vermilion border was also meticulously outlined to ensure appropriate reapproximation during the repair.  The area thus outlined was incised through and through with a #15 scalpel blade.  The muscularis and dermis were reaproximated with deep sutures following hemostasis. Care was taken to realign the vermilion border before proceeding with the superficial closure.  Once the vermilion was realigned the superfical and mucosal closure was finished.

## 2023-04-07 ENCOUNTER — NON-APPOINTMENT (OUTPATIENT)
Age: 73
End: 2023-04-07

## 2023-04-18 NOTE — H&P PST ADULT - NSCAFFEAMTFREQ_GEN_ALL_CORE_SD
Render Risk Assessment In Note?: no
Detail Level: Simple
Additional Notes: Dad x2
1-2 cups/cans per day

## 2023-04-21 ENCOUNTER — OUTPATIENT (OUTPATIENT)
Dept: OUTPATIENT SERVICES | Facility: HOSPITAL | Age: 73
LOS: 1 days | End: 2023-04-21
Payer: MEDICARE

## 2023-04-21 VITALS
HEART RATE: 82 BPM | RESPIRATION RATE: 16 BRPM | TEMPERATURE: 98 F | OXYGEN SATURATION: 97 % | SYSTOLIC BLOOD PRESSURE: 123 MMHG | WEIGHT: 231.93 LBS | HEIGHT: 74 IN | DIASTOLIC BLOOD PRESSURE: 83 MMHG

## 2023-04-21 DIAGNOSIS — Z98.890 OTHER SPECIFIED POSTPROCEDURAL STATES: Chronic | ICD-10-CM

## 2023-04-21 DIAGNOSIS — Q28.2 ARTERIOVENOUS MALFORMATION OF CEREBRAL VESSELS: ICD-10-CM

## 2023-04-21 DIAGNOSIS — I10 ESSENTIAL (PRIMARY) HYPERTENSION: ICD-10-CM

## 2023-04-21 DIAGNOSIS — Z29.9 ENCOUNTER FOR PROPHYLACTIC MEASURES, UNSPECIFIED: ICD-10-CM

## 2023-04-21 DIAGNOSIS — I21.9 ACUTE MYOCARDIAL INFARCTION, UNSPECIFIED: ICD-10-CM

## 2023-04-21 DIAGNOSIS — Z87.19 PERSONAL HISTORY OF OTHER DISEASES OF THE DIGESTIVE SYSTEM: Chronic | ICD-10-CM

## 2023-04-21 LAB
ANION GAP SERPL CALC-SCNC: 12 MMOL/L — SIGNIFICANT CHANGE UP (ref 5–17)
BLD GP AB SCN SERPL QL: NEGATIVE — SIGNIFICANT CHANGE UP
BUN SERPL-MCNC: 20 MG/DL — SIGNIFICANT CHANGE UP (ref 7–23)
CALCIUM SERPL-MCNC: 10 MG/DL — SIGNIFICANT CHANGE UP (ref 8.4–10.5)
CHLORIDE SERPL-SCNC: 103 MMOL/L — SIGNIFICANT CHANGE UP (ref 96–108)
CO2 SERPL-SCNC: 24 MMOL/L — SIGNIFICANT CHANGE UP (ref 22–31)
CREAT SERPL-MCNC: 1 MG/DL — SIGNIFICANT CHANGE UP (ref 0.5–1.3)
EGFR: 80 ML/MIN/1.73M2 — SIGNIFICANT CHANGE UP
GLUCOSE SERPL-MCNC: 110 MG/DL — HIGH (ref 70–99)
HCT VFR BLD CALC: 46.5 % — SIGNIFICANT CHANGE UP (ref 39–50)
HGB BLD-MCNC: 15.6 G/DL — SIGNIFICANT CHANGE UP (ref 13–17)
MCHC RBC-ENTMCNC: 31.7 PG — SIGNIFICANT CHANGE UP (ref 27–34)
MCHC RBC-ENTMCNC: 33.5 GM/DL — SIGNIFICANT CHANGE UP (ref 32–36)
MCV RBC AUTO: 94.5 FL — SIGNIFICANT CHANGE UP (ref 80–100)
NRBC # BLD: 0 /100 WBCS — SIGNIFICANT CHANGE UP (ref 0–0)
PLATELET # BLD AUTO: 223 K/UL — SIGNIFICANT CHANGE UP (ref 150–400)
PLATELET RESPONSE ASPIRIN RESULT: 437 ARU — SIGNIFICANT CHANGE UP (ref 350–700)
POTASSIUM SERPL-MCNC: 4.2 MMOL/L — SIGNIFICANT CHANGE UP (ref 3.5–5.3)
POTASSIUM SERPL-SCNC: 4.2 MMOL/L — SIGNIFICANT CHANGE UP (ref 3.5–5.3)
RBC # BLD: 4.92 M/UL — SIGNIFICANT CHANGE UP (ref 4.2–5.8)
RBC # FLD: 12.6 % — SIGNIFICANT CHANGE UP (ref 10.3–14.5)
RH IG SCN BLD-IMP: POSITIVE — SIGNIFICANT CHANGE UP
SODIUM SERPL-SCNC: 139 MMOL/L — SIGNIFICANT CHANGE UP (ref 135–145)
WBC # BLD: 6.76 K/UL — SIGNIFICANT CHANGE UP (ref 3.8–10.5)
WBC # FLD AUTO: 6.76 K/UL — SIGNIFICANT CHANGE UP (ref 3.8–10.5)

## 2023-04-21 PROCEDURE — 85027 COMPLETE CBC AUTOMATED: CPT

## 2023-04-21 PROCEDURE — 86850 RBC ANTIBODY SCREEN: CPT

## 2023-04-21 PROCEDURE — 36415 COLL VENOUS BLD VENIPUNCTURE: CPT

## 2023-04-21 PROCEDURE — 85576 BLOOD PLATELET AGGREGATION: CPT

## 2023-04-21 PROCEDURE — 86901 BLOOD TYPING SEROLOGIC RH(D): CPT

## 2023-04-21 PROCEDURE — G0463: CPT

## 2023-04-21 PROCEDURE — 86900 BLOOD TYPING SEROLOGIC ABO: CPT

## 2023-04-21 PROCEDURE — 80048 BASIC METABOLIC PNL TOTAL CA: CPT

## 2023-04-21 NOTE — H&P PST ADULT - ASSESSMENT
DASI score: 8.2  DASI activity: Performs  all ADLs without limitations. Walking on treadmills.  Worked on his roof a few weeks ago.   Loose teeth or denture: upper and lower partials.   DASI score: 8.2  DASI activity: Performs  all ADLs without limitations. Walking on treadmills.  Worked on his roof a few weeks ago.   Loose teeth or denture: upper and lower partials.      CAPRINI SCORE [CLOT]    AGE RELATED RISK FACTORS                                                       MOBILITY RELATED FACTORS  [ ] Age 41-60 years                                            (1 Point)                  [ ] Bed rest                                                        (1 Point)  [ xx] Age: 61-74 years                                           (2 Points)                 [ ] Plaster cast                                                   (2 Points)  [ ] Age= 75 years                                              (3 Points)                 [ ] Bed bound for more than 72 hours                 (2 Points)    DISEASE RELATED RISK FACTORS                                               GENDER SPECIFIC FACTORS  [ ] Edema in the lower extremities                       (1 Point)                  [ ] Pregnancy                                                     (1 Point)  [ ] Varicose veins                                               (1 Point)                  [ ] Post-partum < 6 weeks                                   (1 Point)             [xx ] BMI > 25 Kg/m2                                            (1 Point)                  [ ] Hormonal therapy  or oral contraception          (1 Point)                 [ ] Sepsis (in the previous month)                        (1 Point)                  [ ] History of pregnancy complications                 (1 point)  [ ] Pneumonia or serious lung disease                                               [ ] Unexplained or recurrent                     (1 Point)           (in the previous month)                               (1 Point)  [ ] Abnormal pulmonary function test                     (1 Point)                 SURGERY RELATED RISK FACTORS  [xx ] Acute myocardial infarction                              (1 Point)                 [ ]  Section                                             (1 Point)  [ ] Congestive heart failure (in the previous month)  (1 Point)               [ ] Minor surgery                                                  (1 Point)   [ ] Inflammatory bowel disease                             (1 Point)                 [ ] Arthroscopic surgery                                        (2 Points)  [ ] Central venous access                                      (2 Points)                [ ] General surgery lasting more than 45 minutes   (2 Points)       [ ] Stroke (in the previous month)                          (5 Points)               [ ] Elective arthroplasty                                         (5 Points)                                                                                                                                               HEMATOLOGY RELATED FACTORS                                                 TRAUMA RELATED RISK FACTORS  [ ] Prior episodes of VTE                                     (3 Points)                [ ] Fracture of the hip, pelvis, or leg                       (5 Points)  [ ] Positive family history for VTE                         (3 Points)                 [ ] Acute spinal cord injury (in the previous month)  (5 Points)  [ ] Prothrombin 51523 A                                     (3 Points)                 [ ] Paralysis  (less than 1 month)                             (5 Points)  [ ] Factor V Leiden                                             (3 Points)                  [ ] Multiple Trauma within 1 month                        (5 Points)  [ ] Lupus anticoagulants                                     (3 Points)                                                           [ ] Anticardiolipin antibodies                               (3 Points)                                                       [ ] High homocysteine in the blood                      (3 Points)                                             [ ] Other congenital or acquired thrombophilia      (3 Points)                                                [ ] Heparin induced thrombocytopenia                  (3 Points)                                          Total Score [4 ]

## 2023-04-21 NOTE — H&P PST ADULT - HISTORY OF PRESENT ILLNESS
This is a 72 year old male w. AVM    Presenting to [facility] for scheduled for Follow-up cerebral angiogram    June 2022 flu-like symptoms. ( Denies hospitalization)Vaccinated and boosted w. Moderna This is a 72 year old male w. AVM, HTN, HLD, and recent  MI (1/2023.)    Patient reports having a "mild" heart attack in Jan 2023- s/p heart cath no sx intervention needed- Placed on Aspirin 81 mg.- Currently followed by Dr. Cai in [place].     Presenting to PST for scheduled for Follow-up cerebral angiogram    June 2022 flu-like symptoms. ( Denies hospitalization)Vaccinated and boosted w. Moderna This is a 72 year old male w. AVM s/p cerebral angiogram  (3/2022), HTN, HLD, and recent  MI (1/2023.)    Patient reports having a "mild" heart attack in Jan 2023- s/p heart cath no sx intervention needed- Placed on Aspirin 81 mg.- Currently followed by Dr. Cai in [place].     Presenting today to  PST for scheduled for Follow-up cerebral angiogram by Dr. Allan on 4/25/23.     June 2022 flu-like symptoms. ( Denies hospitalization)Vaccinated and boosted w. Moderna

## 2023-04-21 NOTE — H&P PST ADULT - PROBLEM SELECTOR PLAN 1
Scheduled for  F/u Cerebral Angiogram by Dr. Allan on 4/25/23.    Pre-operative instructions and chlorhexidine given to patient.    Labs: cbc, bmp, PT/ INR, Aspirin plt response, type and screen drawn in PST  Continues Aspirin 81 mg. Scheduled for  F/u Cerebral Angiogram by Dr. Allan on 4/25/23.    Pre-operative instructions and chlorhexidine given to patient.    Labs: cbc, bmp, Aspirin plt response, type and screen drawn in PST  Continues Aspirin 81 mg.

## 2023-04-21 NOTE — H&P PST ADULT - PROBLEM SELECTOR PLAN 3
Caprini Score 3 - 6:  At Risk, pharmacologic VTE prophylaxis is indicated for most patients (in the absence of a contraindication)

## 2023-04-25 ENCOUNTER — APPOINTMENT (OUTPATIENT)
Dept: NEUROSURGERY | Facility: HOSPITAL | Age: 73
End: 2023-04-25

## 2023-04-25 ENCOUNTER — OUTPATIENT (OUTPATIENT)
Dept: OUTPATIENT SERVICES | Facility: HOSPITAL | Age: 73
LOS: 1 days | End: 2023-04-25
Payer: MEDICARE

## 2023-04-25 ENCOUNTER — TRANSCRIPTION ENCOUNTER (OUTPATIENT)
Age: 73
End: 2023-04-25

## 2023-04-25 VITALS
HEIGHT: 74 IN | SYSTOLIC BLOOD PRESSURE: 136 MMHG | WEIGHT: 231.93 LBS | DIASTOLIC BLOOD PRESSURE: 92 MMHG | TEMPERATURE: 98 F | HEART RATE: 97 BPM | OXYGEN SATURATION: 99 % | RESPIRATION RATE: 16 BRPM

## 2023-04-25 VITALS
HEART RATE: 86 BPM | DIASTOLIC BLOOD PRESSURE: 80 MMHG | SYSTOLIC BLOOD PRESSURE: 118 MMHG | OXYGEN SATURATION: 97 % | RESPIRATION RATE: 20 BRPM

## 2023-04-25 DIAGNOSIS — Z87.19 PERSONAL HISTORY OF OTHER DISEASES OF THE DIGESTIVE SYSTEM: Chronic | ICD-10-CM

## 2023-04-25 DIAGNOSIS — Z98.890 OTHER SPECIFIED POSTPROCEDURAL STATES: Chronic | ICD-10-CM

## 2023-04-25 DIAGNOSIS — Q28.2 ARTERIOVENOUS MALFORMATION OF CEREBRAL VESSELS: ICD-10-CM

## 2023-04-25 PROCEDURE — C1894: CPT

## 2023-04-25 PROCEDURE — 36227 PLACE CATH XTRNL CAROTID: CPT

## 2023-04-25 PROCEDURE — C1887: CPT

## 2023-04-25 PROCEDURE — 36224 PLACE CATH CAROTD ART: CPT

## 2023-04-25 PROCEDURE — 36224 PLACE CATH CAROTD ART: CPT | Mod: 50

## 2023-04-25 PROCEDURE — 36227 PLACE CATH XTRNL CAROTID: CPT | Mod: 50

## 2023-04-25 PROCEDURE — 61624 TCAT PERM OCCLS/EMBOLJ CNS: CPT

## 2023-04-25 PROCEDURE — 75898 FOLLOW-UP ANGIOGRAPHY: CPT

## 2023-04-25 PROCEDURE — C1760: CPT

## 2023-04-25 PROCEDURE — 75894 X-RAYS TRANSCATH THERAPY: CPT

## 2023-04-25 PROCEDURE — 36226 PLACE CATH VERTEBRAL ART: CPT | Mod: 50

## 2023-04-25 PROCEDURE — 36218 PLACE CATHETER IN ARTERY: CPT | Mod: 59

## 2023-04-25 PROCEDURE — 75774 ARTERY X-RAY EACH VESSEL: CPT | Mod: 26,59

## 2023-04-25 PROCEDURE — 36226 PLACE CATH VERTEBRAL ART: CPT

## 2023-04-25 PROCEDURE — C1769: CPT

## 2023-04-25 RX ORDER — LISINOPRIL 2.5 MG/1
1 TABLET ORAL
Qty: 0 | Refills: 0 | DISCHARGE

## 2023-04-25 RX ORDER — CETIRIZINE HYDROCHLORIDE 10 MG/1
1 TABLET ORAL
Qty: 0 | Refills: 0 | DISCHARGE

## 2023-04-25 RX ORDER — LORATADINE, PSEUDOEPHEDRINE SULFATE 5; 120 MG/1; MG/1
1 TABLET, FILM COATED, EXTENDED RELEASE ORAL
Qty: 0 | Refills: 0 | DISCHARGE

## 2023-04-25 RX ORDER — FENOFIBRATE,MICRONIZED 130 MG
1 CAPSULE ORAL
Qty: 0 | Refills: 0 | DISCHARGE

## 2023-04-25 RX ORDER — RANOLAZINE 500 MG/1
500 TABLET, FILM COATED, EXTENDED RELEASE ORAL
Refills: 0 | DISCHARGE

## 2023-04-25 RX ORDER — HYDROCHLOROTHIAZIDE 25 MG
1 TABLET ORAL
Qty: 0 | Refills: 0 | DISCHARGE

## 2023-04-25 RX ORDER — FEXOFENADINE HCL AND PSEUDOEPHEDRINE HCI 60; 120 MG/1; MG/1
1 TABLET, EXTENDED RELEASE ORAL
Qty: 0 | Refills: 0 | DISCHARGE

## 2023-04-25 NOTE — CHART NOTE - NSCHARTNOTEFT_GEN_A_CORE
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     Neuro Exam: Awake and alert, oriented x3, fluent, normal naming and repetition, follows 3 step commands. Extraocular movements intact, no nystagmus, visual fields full, face symmetric, tongue midline. No drift, 5/5 power x 4 extremities. Normal sensation to LT. Normal finger-to-nose and rapid alternating movements.    PAST MEDICAL & SURGICAL HISTORY:  Cerebral AVM  HTN (hypertension)  HLD (hyperlipidemia)  Migraine headache  H/O herpes simplex infection  Seasonal allergies  MI (myocardial infarction)- Patient reports having a "mild" heart attack in Jan 2023- s/p heart cath no sx intervention needed- Placed on Aspirin 81 mg.- Currently followed by Dr. 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- s/p injury ( 1972)  H/O cholecystitis h/o cholecystectomy      Social History:   Denies tobacco use    FAMILY HISTORY:  FH: type 2 diabetes (Sibling)      Allergies:   Cortizone for Kids (Hives)      Current Medications:   · 	hydroCHLOROthiazide 12.5 mg oral tablet: Last Dose Taken:  , 1 tab(s) orally once a day  · 	pravastatin 80 mg oral tablet: Last Dose Taken:  , 1 orally once a day  · 	lisinopril 40 mg oral tablet: Last Dose Taken:  , 1 tab(s) orally once a day  · 	Multiple Vitamins oral tablet: Last Dose Taken:  , 1 tab(s) orally once a day  · 	Claritin-D 12 Hour: Last Dose Taken:  , 1  orally , As Needed  · 	ZyrTEC 10 mg oral tablet: Last Dose Taken:  , 1  orally , As Needed  · 	Allegra-D 12 Hour: Last Dose Taken:  , 1  orally , As Needed  · 	fenofibrate 145 mg oral tablet: Last Dose Taken:  , 1 tab(s) orally once a day  · 	ranolazine 500 mg oral granule, extended release: Last Dose Taken:  , 500 orally 2 times a day    Labs:                         15.6   6.76  )-----------( 223      ( 21 Apr 2023 16:28 )             46.5       04-21    139  |  103  |  20  ----------------------------<  110<H>  4.2   |  24  |  1.00        Assessment/Plan:   This is a 73yo male  presents with AVM s/p embolization. Patient presents to neuro-IR for selective cerebral angiography. Procedure/ risks/ benefits/ goals/ alternatives were explained. Risks include but are not limited to stroke/ vessel injury/ hemorrhage/ groin hematoma. All questions answered. Informed content obtained from patient. Consent placed in chart.    Leslie Keith PA-C  [phone]

## 2023-04-25 NOTE — CHART NOTE - NSCHARTNOTEFT_GEN_A_CORE
Interventional Neuro- Radiology   Procedure Note      Procedure: Selective Cerebral Angiography   Pre- Procedure Diagnosis: AVM s/p embolization   Post- Procedure Diagnosis:    : Dr. Jerrell MD  Fellow: MD Dr. Sunny Saldaña MD Dr. Toscano, MD   Physician Assistant: Leslie Keith PA-C    RN:  Tech:    Anesthesia: (MAC)    I/Os:  Fluids:  Gómez: DTV   Contrast:  Estimated Blood Loss: <10cc    Preliminary Report:  Under MAC, using a ___Fr short/long sheath to the right femoral artery examination of left vertebral artery/ left internal carotid artery/ left external carotid artery/ right vertebral artery/ right internal carotid artery/ right external carotid artery via selective cerebral angiography demonstrates ________. ( Official note to follow).    Patient tolerated procedure well, vital signs stable, hemodynamically stable, no change in neurological status compared to baseline. Results discussed with neurosurgery/ patient and their family. Groin sheath d/c'ed, manual compression held to hemostasis, no active bleeding, no hematoma, Vascade applied, quick clot and safeguard balloon dressing applied at _____h. Patient transferred to IR recovery for further care/ monitoring. Interventional Neuro- Radiology   Procedure Note      Procedure: Selective Cerebral Angiography   Pre- Procedure Diagnosis: AVF s/p embolization   Post- Procedure Diagnosis: no residual AVF     : Dr. Jerrell MD  Fellow: MD Dr. Sunny Saldaña MD Dr. Toscano, MD   Physician Assistant: Leslie Keith PA-C    RN: Nan   Tech: Da/ Caryl     Anesthesia: Dr. Combs (MAC)    I/Os:  Fluids: 150cc   Gómez: DTV   Contrast: 92cc   Estimated Blood Loss: <10cc    Preliminary Report:  Under MAC, using a 5Fr short sheath to the right femoral artery examination of left vertebral artery/ left internal carotid artery/ left external carotid artery/ right vertebral artery/ right internal carotid artery/ right external carotid artery via selective cerebral angiography demonstrates no residual filling of AVF. ( Official note to follow).    Patient tolerated procedure well, vital signs stable, hemodynamically stable, no change in neurological status compared to baseline. Results discussed with neurosurgery/ patient and their family. Groin sheath d/c'ed, manual compression held to hemostasis, no active bleeding, no hematoma, Vascade applied, quick clot and safeguard balloon dressing applied at 930h. Patient transferred to IR recovery for further care/ monitoring.    Leslie Keith PA-C  [phone]

## 2023-04-25 NOTE — ASU DISCHARGE PLAN (ADULT/PEDIATRIC) - NURSING INSTRUCTIONS
Please feel free to contact us at [phone] if any problems arise. After 6PM, Monday through Friday, on weekends and on holidays, please call [phone] and ask for the radiology resident on call to be paged.

## 2023-04-25 NOTE — ASU DISCHARGE PLAN (ADULT/PEDIATRIC) - CARE PROVIDER_API CALL
Albert Allan)  Neurosurgery  [address] [facility], [address]  [place] [address]  Phone: [phone]  Fax: [phone]  Follow Up Time:

## 2023-04-25 NOTE — ASU DISCHARGE PLAN (ADULT/PEDIATRIC) - NS MD DC FALL RISK RISK
For information on Fall & Injury Prevention, visit: [email or web address] OR  [email or web address] OR  https://www.cdc.gov/steadi/patient.html

## 2023-05-03 NOTE — ASU PATIENT PROFILE, ADULT - FALL HARM RISK - CONCLUSION
How Severe Are Your Spot(S)?: mild
What Type Of Note Output Would You Prefer (Optional)?: Standard Output
What Is The Reason For Today's Visit?: Full Body Skin Examination
What Is The Reason For Today's Visit? (Being Monitored For X): concerning skin lesions on an annual basis
Universal Safety Interventions

## 2023-06-26 NOTE — H&P PST ADULT - NECK DETAILS
Spoke with Dimitrios, offered to move her appointment up sooner, but the date I offered did not work for her schedule. I also requested her permission to send new patient paperwork through the portal, she gave permission and I sent it.  
no JVD/carotid bruit L

## 2023-09-05 NOTE — ASU PREOP CHECKLIST - TEMPERATURE IN FAHRENHEIT (DEGREES F)
89 Takes oxycodone 15mg multiple times per day for chronic pain prescribed by pain dr  - confirmed on  -- pt recently picked up Rx of oxy 15mg, 175 tabs for a 30d supply  - will continue Oxycodonde 15mg prn inpatient -- increase frequency given acute on chronic pain and will add IV prn pain med for breakthrough pain or inability to tolerate po med
